# Patient Record
Sex: MALE | Race: WHITE | NOT HISPANIC OR LATINO | Employment: FULL TIME | URBAN - METROPOLITAN AREA
[De-identification: names, ages, dates, MRNs, and addresses within clinical notes are randomized per-mention and may not be internally consistent; named-entity substitution may affect disease eponyms.]

---

## 2023-04-26 LAB
ALANINE AMINOTRANSFERASE (SGPT) (U/L) IN SER/PLAS: 25 U/L (ref 10–52)
ALBUMIN (G/DL) IN SER/PLAS: 4.6 G/DL (ref 3.4–5)
ALKALINE PHOSPHATASE (U/L) IN SER/PLAS: 63 U/L (ref 33–120)
ANION GAP IN SER/PLAS: 12 MMOL/L (ref 10–20)
ASPARTATE AMINOTRANSFERASE (SGOT) (U/L) IN SER/PLAS: 27 U/L (ref 9–39)
BASOPHILS (10*3/UL) IN BLOOD BY AUTOMATED COUNT: 0.04 X10E9/L (ref 0–0.1)
BASOPHILS/100 LEUKOCYTES IN BLOOD BY AUTOMATED COUNT: 0.4 % (ref 0–2)
BILIRUBIN TOTAL (MG/DL) IN SER/PLAS: 0.4 MG/DL (ref 0–1.2)
CALCIUM (MG/DL) IN SER/PLAS: 10.1 MG/DL (ref 8.6–10.6)
CARBON DIOXIDE, TOTAL (MMOL/L) IN SER/PLAS: 32 MMOL/L (ref 21–32)
CHLORIDE (MMOL/L) IN SER/PLAS: 100 MMOL/L (ref 98–107)
CHOLESTEROL (MG/DL) IN SER/PLAS: 217 MG/DL (ref 0–199)
CHOLESTEROL IN HDL (MG/DL) IN SER/PLAS: 48.7 MG/DL
CHOLESTEROL/HDL RATIO: 4.5
CREATININE (MG/DL) IN SER/PLAS: 1.14 MG/DL (ref 0.5–1.3)
EOSINOPHILS (10*3/UL) IN BLOOD BY AUTOMATED COUNT: 0.07 X10E9/L (ref 0–0.7)
EOSINOPHILS/100 LEUKOCYTES IN BLOOD BY AUTOMATED COUNT: 0.6 % (ref 0–6)
ERYTHROCYTE DISTRIBUTION WIDTH (RATIO) BY AUTOMATED COUNT: 13.1 % (ref 11.5–14.5)
ERYTHROCYTE MEAN CORPUSCULAR HEMOGLOBIN CONCENTRATION (G/DL) BY AUTOMATED: 32.3 G/DL (ref 32–36)
ERYTHROCYTE MEAN CORPUSCULAR VOLUME (FL) BY AUTOMATED COUNT: 100 FL (ref 80–100)
ERYTHROCYTES (10*6/UL) IN BLOOD BY AUTOMATED COUNT: 4.65 X10E12/L (ref 4.5–5.9)
GFR MALE: 80 ML/MIN/1.73M2
GLUCOSE (MG/DL) IN SER/PLAS: 100 MG/DL (ref 74–99)
HEMATOCRIT (%) IN BLOOD BY AUTOMATED COUNT: 46.5 % (ref 41–52)
HEMOGLOBIN (G/DL) IN BLOOD: 15 G/DL (ref 13.5–17.5)
IMMATURE GRANULOCYTES/100 LEUKOCYTES IN BLOOD BY AUTOMATED COUNT: 0.4 % (ref 0–0.9)
LDL: 123 MG/DL (ref 0–99)
LEUKOCYTES (10*3/UL) IN BLOOD BY AUTOMATED COUNT: 11.4 X10E9/L (ref 4.4–11.3)
LYMPHOCYTES (10*3/UL) IN BLOOD BY AUTOMATED COUNT: 3.44 X10E9/L (ref 1.2–4.8)
LYMPHOCYTES/100 LEUKOCYTES IN BLOOD BY AUTOMATED COUNT: 30.3 % (ref 13–44)
MONOCYTES (10*3/UL) IN BLOOD BY AUTOMATED COUNT: 0.69 X10E9/L (ref 0.1–1)
MONOCYTES/100 LEUKOCYTES IN BLOOD BY AUTOMATED COUNT: 6.1 % (ref 2–10)
NEUTROPHILS (10*3/UL) IN BLOOD BY AUTOMATED COUNT: 7.09 X10E9/L (ref 1.2–7.7)
NEUTROPHILS/100 LEUKOCYTES IN BLOOD BY AUTOMATED COUNT: 62.2 % (ref 40–80)
NON HDL CHOLESTEROL: 168 MG/DL
NRBC (PER 100 WBCS) BY AUTOMATED COUNT: 0 /100 WBC (ref 0–0)
PLATELETS (10*3/UL) IN BLOOD AUTOMATED COUNT: 310 X10E9/L (ref 150–450)
POTASSIUM (MMOL/L) IN SER/PLAS: 4.8 MMOL/L (ref 3.5–5.3)
PROSTATE SPECIFIC AG (NG/ML) IN SER/PLAS: 0.28 NG/ML (ref 0–4)
PROTEIN TOTAL: 7.1 G/DL (ref 6.4–8.2)
SODIUM (MMOL/L) IN SER/PLAS: 139 MMOL/L (ref 136–145)
THYROTROPIN (MIU/L) IN SER/PLAS BY DETECTION LIMIT <= 0.05 MIU/L: 0.34 MIU/L (ref 0.44–3.98)
THYROXINE (T4) FREE (NG/DL) IN SER/PLAS: 0.86 NG/DL (ref 0.78–1.48)
TRIGLYCERIDE (MG/DL) IN SER/PLAS: 225 MG/DL (ref 0–149)
UREA NITROGEN (MG/DL) IN SER/PLAS: 13 MG/DL (ref 6–23)
VLDL: 45 MG/DL (ref 0–40)

## 2023-05-03 LAB
TESTOSTERONE FREE (CHAN): 23.6 PG/ML (ref 35–155)
TESTOSTERONE,TOTAL,LC-MS/MS: 107 NG/DL (ref 250–1100)

## 2023-06-06 LAB
FOLLITROPIN (IU/L) IN SER/PLAS: 4 IU/L
LUTEINIZING HORMONE (IU/ML) IN SER/PLAS: 1.9 IU/L

## 2023-06-09 LAB — ADRENOCORTICOTROPIC HORMONE: 14.5 PG/ML (ref 7.2–63.3)

## 2023-06-13 LAB — CORTISOL FREE, SERUM: 0.28 UG/DL

## 2023-07-17 ENCOUNTER — TELEMEDICINE (OUTPATIENT)
Dept: PRIMARY CARE | Facility: CLINIC | Age: 48
End: 2023-07-17
Payer: MEDICARE

## 2023-07-17 DIAGNOSIS — G25.81 RESTLESS LEG SYNDROME: Primary | ICD-10-CM

## 2023-07-17 PROCEDURE — 99213 OFFICE O/P EST LOW 20 MIN: CPT | Performed by: FAMILY MEDICINE

## 2023-07-17 ASSESSMENT — ENCOUNTER SYMPTOMS
SHORTNESS OF BREATH: 0
FEVER: 0

## 2023-07-18 NOTE — PROGRESS NOTES
"Subjective   Patient ID: Gael Lara is a 47 y.o. male who presents for No chief complaint on file..  HPI  Patient presents for virtual visit.  He is accompanied by his wife.  He is concerned with worsening restless sensation in both arms and legs, and generalized itching over last few nights, no response to Hydroxyzine, not much of response to Benadryl.      Patient has been treated with Mirapex and Requip in past, without much relief.  He states he has been also treated with Gabapentin in past.  Review of Systems   Constitutional:  Negative for fever.   Respiratory:  Negative for shortness of breath.    Cardiovascular:  Negative for chest pain.   Neurological:         Sensation of \"needing to move\" in both arms and legs       Objective   There were no vitals filed for this visit.   Physical Exam  Constitutional:       General: He is not in acute distress.     Appearance: Normal appearance.   HENT:      Head: Normocephalic and atraumatic.      Right Ear: External ear normal.      Left Ear: External ear normal.      Nose: Nose normal.   Eyes:      Conjunctiva/sclera: Conjunctivae normal.   Pulmonary:      Effort: Pulmonary effort is normal. No respiratory distress.   Neurological:      Mental Status: He is alert and oriented to person, place, and time.   Psychiatric:         Mood and Affect: Mood normal.         Behavior: Behavior normal.         Thought Content: Thought content normal.         Assessment/Plan   There are no diagnoses linked to this encounter.    Problem List Items Addressed This Visit    None  Visit Diagnoses       Restless leg syndrome    -  Primary    This is really not something that can be adequately addressed via virtual visit.  Discussed specific iron studies:TIBC, ferritin levels, also sleep referral          "

## 2023-10-08 PROBLEM — R60.9 PERIPHERAL EDEMA: Status: ACTIVE | Noted: 2023-10-08

## 2023-10-08 PROBLEM — F42.2 MIXED OBSESSIONAL THOUGHTS AND ACTS: Status: ACTIVE | Noted: 2022-01-07

## 2023-10-08 PROBLEM — M25.475 BILATERAL SWELLING OF FEET AND ANKLES: Status: ACTIVE | Noted: 2023-10-08

## 2023-10-08 PROBLEM — M25.561 BILATERAL KNEE PAIN: Status: ACTIVE | Noted: 2018-09-26

## 2023-10-08 PROBLEM — R52 PAIN: Status: ACTIVE | Noted: 2023-10-08

## 2023-10-08 PROBLEM — S93.491A SPRAIN OF ANTERIOR TALOFIBULAR LIGAMENT OF RIGHT ANKLE: Status: ACTIVE | Noted: 2023-10-08

## 2023-10-08 PROBLEM — J45.991 COUGH VARIANT ASTHMA (HHS-HCC): Status: ACTIVE | Noted: 2020-03-16

## 2023-10-08 PROBLEM — F33.1 DEPRESSION, MAJOR, RECURRENT, MODERATE (MULTI): Status: ACTIVE | Noted: 2023-10-08

## 2023-10-08 PROBLEM — R05.3 CHRONIC COUGH: Status: ACTIVE | Noted: 2020-03-16

## 2023-10-08 PROBLEM — R68.82 DECREASED LIBIDO: Status: ACTIVE | Noted: 2023-10-08

## 2023-10-08 PROBLEM — N62 GYNECOMASTIA: Status: ACTIVE | Noted: 2023-10-08

## 2023-10-08 PROBLEM — M79.18 CERVICAL MYOFASCIAL PAIN SYNDROME: Status: ACTIVE | Noted: 2020-03-16

## 2023-10-08 PROBLEM — B37.0 CANDIDIASIS OF MOUTH: Status: ACTIVE | Noted: 2020-03-16

## 2023-10-08 PROBLEM — G60.9 IDIOPATHIC PERIPHERAL NEUROPATHY: Status: ACTIVE | Noted: 2020-03-16

## 2023-10-08 PROBLEM — R79.89 ABNORMAL THYROID BLOOD TEST: Status: ACTIVE | Noted: 2023-10-08

## 2023-10-08 PROBLEM — T50.905A ADVERSE EFFECT OF DRUG: Status: ACTIVE | Noted: 2023-10-08

## 2023-10-08 PROBLEM — R26.2 DIFFICULTY WALKING: Status: ACTIVE | Noted: 2023-10-08

## 2023-10-08 PROBLEM — M25.472 BILATERAL SWELLING OF FEET AND ANKLES: Status: ACTIVE | Noted: 2023-10-08

## 2023-10-08 PROBLEM — G99.2 STENOSIS OF CERVICAL SPINE WITH MYELOPATHY (MULTI): Status: ACTIVE | Noted: 2023-10-08

## 2023-10-08 PROBLEM — H91.90 HEARING LOSS: Status: ACTIVE | Noted: 2020-03-16

## 2023-10-08 PROBLEM — E88.9 METABOLIC DISORDER: Status: ACTIVE | Noted: 2023-10-08

## 2023-10-08 PROBLEM — R03.0 ELEVATED BLOOD PRESSURE READING: Status: ACTIVE | Noted: 2023-10-08

## 2023-10-08 PROBLEM — N64.4 BREAST TENDERNESS IN MALE: Status: ACTIVE | Noted: 2023-10-08

## 2023-10-08 PROBLEM — R06.83 SNORING: Status: ACTIVE | Noted: 2020-03-16

## 2023-10-08 PROBLEM — H69.93 EUSTACHIAN TUBE DYSFUNCTION, BILATERAL: Status: ACTIVE | Noted: 2018-09-26

## 2023-10-08 PROBLEM — M54.12 CERVICAL RADICULOPATHY: Status: ACTIVE | Noted: 2020-03-16

## 2023-10-08 PROBLEM — M77.50 TENDONITIS OF ANKLE OR FOOT: Status: ACTIVE | Noted: 2023-10-08

## 2023-10-08 PROBLEM — R60.0 EDEMA OF BOTH LOWER EXTREMITIES: Status: ACTIVE | Noted: 2023-10-08

## 2023-10-08 PROBLEM — R06.2 WHEEZING ON AUSCULTATION: Status: ACTIVE | Noted: 2023-10-08

## 2023-10-08 PROBLEM — F11.21 OPIOID DEPENDENCE IN REMISSION (MULTI): Status: ACTIVE | Noted: 2022-01-07

## 2023-10-08 PROBLEM — M25.474 BILATERAL SWELLING OF FEET AND ANKLES: Status: ACTIVE | Noted: 2023-10-08

## 2023-10-08 PROBLEM — I87.2 CHRONIC VENOUS INSUFFICIENCY: Status: ACTIVE | Noted: 2023-10-08

## 2023-10-08 PROBLEM — G47.00 INSOMNIA: Status: ACTIVE | Noted: 2020-03-16

## 2023-10-08 PROBLEM — M25.50 JOINT PAIN: Status: ACTIVE | Noted: 2023-10-08

## 2023-10-08 PROBLEM — M54.2 NECK PAIN: Status: ACTIVE | Noted: 2023-10-08

## 2023-10-08 PROBLEM — G47.19 EXCESSIVE DAYTIME SLEEPINESS: Status: ACTIVE | Noted: 2023-10-08

## 2023-10-08 PROBLEM — S16.1XXA STRAIN OF NECK MUSCLE: Status: ACTIVE | Noted: 2020-03-16

## 2023-10-08 PROBLEM — K12.0 APHTHAE, ORAL: Status: ACTIVE | Noted: 2023-10-08

## 2023-10-08 PROBLEM — E86.1 HYPOVOLEMIA: Status: ACTIVE | Noted: 2020-03-16

## 2023-10-08 PROBLEM — B37.81: Status: ACTIVE | Noted: 2023-10-08

## 2023-10-08 PROBLEM — K13.79 MOUTH SORES: Status: ACTIVE | Noted: 2023-10-08

## 2023-10-08 PROBLEM — M25.471 RIGHT ANKLE SWELLING: Status: ACTIVE | Noted: 2023-10-08

## 2023-10-08 PROBLEM — F41.0 PANIC DISORDER WITHOUT AGORAPHOBIA: Status: ACTIVE | Noted: 2022-08-22

## 2023-10-08 PROBLEM — M48.02 STENOSIS OF CERVICAL SPINE WITH MYELOPATHY (MULTI): Status: ACTIVE | Noted: 2023-10-08

## 2023-10-08 PROBLEM — G25.0 ESSENTIAL TREMOR: Status: ACTIVE | Noted: 2020-03-16

## 2023-10-08 PROBLEM — R53.83 MALAISE AND FATIGUE: Status: ACTIVE | Noted: 2020-03-16

## 2023-10-08 PROBLEM — D84.9 IMMUNOSUPPRESSION (MULTI): Status: ACTIVE | Noted: 2020-03-16

## 2023-10-08 PROBLEM — R51.9 CHRONIC HEADACHE DISORDER: Status: ACTIVE | Noted: 2020-03-16

## 2023-10-08 PROBLEM — J18.9 COMMUNITY ACQUIRED PNEUMONIA: Status: ACTIVE | Noted: 2020-03-16

## 2023-10-08 PROBLEM — M50.30 DEGENERATIVE DISC DISEASE, CERVICAL: Status: ACTIVE | Noted: 2023-10-08

## 2023-10-08 PROBLEM — B27.90 INFECTIOUS MONONUCLEOSIS: Status: ACTIVE | Noted: 2020-03-16

## 2023-10-08 PROBLEM — R29.818 SUSPECTED SLEEP APNEA: Status: ACTIVE | Noted: 2023-10-08

## 2023-10-08 PROBLEM — M25.562 BILATERAL KNEE PAIN: Status: ACTIVE | Noted: 2018-09-26

## 2023-10-08 PROBLEM — F41.8 MIXED ANXIETY DEPRESSIVE DISORDER: Status: ACTIVE | Noted: 2023-10-08

## 2023-10-08 PROBLEM — R79.89 DECREASED THYROID STIMULATING HORMONE (TSH) LEVEL: Status: ACTIVE | Noted: 2020-03-16

## 2023-10-08 PROBLEM — R09.89 CHEST CONGESTION: Status: ACTIVE | Noted: 2023-10-08

## 2023-10-08 PROBLEM — K11.20 SIALOADENITIS: Status: ACTIVE | Noted: 2020-03-16

## 2023-10-08 PROBLEM — G89.29 CHRONIC HEADACHE DISORDER: Status: ACTIVE | Noted: 2020-03-16

## 2023-10-08 PROBLEM — R51.9 HEADACHE: Status: ACTIVE | Noted: 2018-09-26

## 2023-10-08 PROBLEM — G89.29 CHRONIC PAIN OF BOTH EARS: Status: ACTIVE | Noted: 2018-09-26

## 2023-10-08 PROBLEM — A09 INFECTIOUS COLITIS: Status: ACTIVE | Noted: 2020-03-16

## 2023-10-08 PROBLEM — K42.9 UMBILICAL HERNIA: Status: ACTIVE | Noted: 2020-03-16

## 2023-10-08 PROBLEM — R53.81 MALAISE AND FATIGUE: Status: ACTIVE | Noted: 2020-03-16

## 2023-10-08 PROBLEM — M17.0 ARTHRITIS OF BOTH KNEES: Status: ACTIVE | Noted: 2020-03-16

## 2023-10-08 PROBLEM — I10 BENIGN ESSENTIAL HYPERTENSION: Status: ACTIVE | Noted: 2020-03-16

## 2023-10-08 PROBLEM — G61.0: Status: ACTIVE | Noted: 2018-01-30

## 2023-10-08 PROBLEM — M25.571 PAIN IN LATERAL PORTION OF RIGHT ANKLE: Status: ACTIVE | Noted: 2023-10-08

## 2023-10-08 PROBLEM — M25.471 BILATERAL SWELLING OF FEET AND ANKLES: Status: ACTIVE | Noted: 2023-10-08

## 2023-10-08 PROBLEM — I47.19 ATRIAL TACHYCARDIA (CMS-HCC): Status: ACTIVE | Noted: 2020-03-16

## 2023-10-08 PROBLEM — N28.9 ABNORMAL KIDNEY FUNCTION: Status: ACTIVE | Noted: 2023-10-08

## 2023-10-08 PROBLEM — G47.33 OBSTRUCTIVE SLEEP APNEA SYNDROME: Status: ACTIVE | Noted: 2020-03-16

## 2023-10-08 PROBLEM — E05.90 SUBCLINICAL HYPERTHYROIDISM: Status: ACTIVE | Noted: 2020-03-16

## 2023-10-08 PROBLEM — F31.81 BIPOLAR II DISORDER (MULTI): Status: ACTIVE | Noted: 2022-01-07

## 2023-10-08 PROBLEM — J30.0 VASOMOTOR RHINITIS: Status: ACTIVE | Noted: 2023-10-08

## 2023-10-08 PROBLEM — H92.03 CHRONIC PAIN OF BOTH EARS: Status: ACTIVE | Noted: 2018-09-26

## 2023-10-08 PROBLEM — E29.1 HYPOGONADISM MALE: Status: ACTIVE | Noted: 2023-10-08

## 2023-10-08 PROBLEM — H93.8X9 PRESSURE SENSATION IN EAR: Status: ACTIVE | Noted: 2018-09-26

## 2023-10-08 PROBLEM — R60.0 PERIPHERAL EDEMA: Status: ACTIVE | Noted: 2023-10-08

## 2023-10-08 PROBLEM — T50.905A DRUG REACTION: Status: ACTIVE | Noted: 2023-10-08

## 2023-10-08 RX ORDER — ESKETAMINE HYDROCHLORIDE 28 MG/.2ML
SOLUTION NASAL
COMMUNITY
Start: 2023-05-05 | End: 2023-12-06 | Stop reason: ALTCHOICE

## 2023-10-08 RX ORDER — FINASTERIDE 5 MG/1
1 TABLET, FILM COATED ORAL DAILY
COMMUNITY
Start: 2016-04-11 | End: 2023-12-06 | Stop reason: SDUPTHER

## 2023-10-08 RX ORDER — DULOXETIN HYDROCHLORIDE 60 MG/1
60 CAPSULE, DELAYED RELEASE ORAL DAILY
COMMUNITY
End: 2023-12-06 | Stop reason: ALTCHOICE

## 2023-10-08 RX ORDER — TIZANIDINE 4 MG/1
1 TABLET ORAL 3 TIMES DAILY PRN
COMMUNITY
Start: 2021-08-10 | End: 2023-12-06 | Stop reason: ALTCHOICE

## 2023-10-08 RX ORDER — BACLOFEN 5 MG/1
5 TABLET ORAL 4 TIMES DAILY
COMMUNITY
Start: 2023-08-02 | End: 2023-12-06 | Stop reason: SDUPTHER

## 2023-10-08 RX ORDER — NAPROXEN 500 MG/1
500 TABLET ORAL
COMMUNITY
Start: 2018-11-02

## 2023-10-08 RX ORDER — ROPINIROLE 0.5 MG/1
0.5 TABLET, FILM COATED ORAL 3 TIMES DAILY
COMMUNITY
Start: 2023-06-16 | End: 2023-12-06 | Stop reason: ALTCHOICE

## 2023-10-08 RX ORDER — DIVALPROEX SODIUM 500 MG/1
2 TABLET, FILM COATED, EXTENDED RELEASE ORAL NIGHTLY
COMMUNITY
Start: 2023-01-05 | End: 2023-12-06 | Stop reason: ALTCHOICE

## 2023-10-08 RX ORDER — VILAZODONE HYDROCHLORIDE 10 MG/1
10 TABLET ORAL DAILY
COMMUNITY
Start: 2023-07-11 | End: 2024-03-21 | Stop reason: ALTCHOICE

## 2023-10-08 RX ORDER — ESCITALOPRAM OXALATE 20 MG/1
2 TABLET ORAL DAILY
COMMUNITY
Start: 2023-04-03 | End: 2023-12-06 | Stop reason: ALTCHOICE

## 2023-10-08 RX ORDER — CLOMIPRAMINE HYDROCHLORIDE 50 MG/1
200 CAPSULE ORAL NIGHTLY
COMMUNITY
Start: 2022-11-19 | End: 2023-12-06 | Stop reason: ALTCHOICE

## 2023-10-08 RX ORDER — BENZTROPINE MESYLATE 2 MG/1
2 TABLET ORAL 3 TIMES DAILY PRN
COMMUNITY
Start: 2022-11-01 | End: 2023-12-06 | Stop reason: ALTCHOICE

## 2023-10-08 RX ORDER — DULOXETIN HYDROCHLORIDE 30 MG/1
30 CAPSULE, DELAYED RELEASE ORAL
COMMUNITY
Start: 2023-01-05 | End: 2023-12-06 | Stop reason: ALTCHOICE

## 2023-10-08 RX ORDER — VALACYCLOVIR HYDROCHLORIDE 1 G/1
1000 TABLET, FILM COATED ORAL EVERY 12 HOURS
COMMUNITY
Start: 2023-06-13 | End: 2023-10-18 | Stop reason: SDUPTHER

## 2023-10-08 RX ORDER — ESKETAMINE HYDROCHLORIDE 28 MG/.2ML
SOLUTION NASAL
COMMUNITY
Start: 2023-06-26 | End: 2023-12-06 | Stop reason: ALTCHOICE

## 2023-10-08 RX ORDER — GABAPENTIN 100 MG/1
1-2 CAPSULE ORAL 3 TIMES DAILY
COMMUNITY
Start: 2022-05-04 | End: 2023-12-06 | Stop reason: ALTCHOICE

## 2023-10-08 RX ORDER — OXCARBAZEPINE 300 MG/1
TABLET, FILM COATED ORAL
COMMUNITY
Start: 2022-01-07 | End: 2023-12-06 | Stop reason: ALTCHOICE

## 2023-10-08 RX ORDER — TESTOSTERONE CYPIONATE 200 MG/ML
2 INJECTION, SOLUTION INTRAMUSCULAR
COMMUNITY
End: 2023-12-06 | Stop reason: ALTCHOICE

## 2023-10-08 RX ORDER — HYDROXYZINE PAMOATE 50 MG/1
CAPSULE ORAL
COMMUNITY
Start: 2020-01-22 | End: 2023-12-06 | Stop reason: ALTCHOICE

## 2023-10-08 RX ORDER — BACLOFEN 5 MG/1
1 TABLET ORAL 3 TIMES DAILY
COMMUNITY
Start: 2023-03-28 | End: 2024-03-21 | Stop reason: SDUPTHER

## 2023-10-08 RX ORDER — BUPRENORPHINE HYDROCHLORIDE 8 MG/1
8 TABLET SUBLINGUAL DAILY
COMMUNITY
Start: 2022-05-31 | End: 2023-12-06 | Stop reason: ALTCHOICE

## 2023-10-08 RX ORDER — PROPRANOLOL HYDROCHLORIDE 40 MG/1
1 TABLET ORAL 2 TIMES DAILY
COMMUNITY
Start: 2021-01-27 | End: 2023-12-06 | Stop reason: ALTCHOICE

## 2023-10-08 RX ORDER — IMIPRAMINE HYDROCHLORIDE 50 MG/1
50 TABLET, FILM COATED ORAL NIGHTLY
COMMUNITY
End: 2023-12-06 | Stop reason: ALTCHOICE

## 2023-10-08 RX ORDER — PRAMIPEXOLE DIHYDROCHLORIDE 0.5 MG/1
0.5 TABLET ORAL 2 TIMES DAILY
COMMUNITY
Start: 2023-07-10 | End: 2023-12-06 | Stop reason: ALTCHOICE

## 2023-10-08 RX ORDER — CLOMIPHENE CITRATE 50 MG/1
50 TABLET ORAL DAILY
COMMUNITY
End: 2023-12-06 | Stop reason: ALTCHOICE

## 2023-10-08 RX ORDER — RISPERIDONE 1 MG/1
1 TABLET ORAL EVERY EVENING
COMMUNITY
End: 2023-12-06 | Stop reason: ALTCHOICE

## 2023-10-08 RX ORDER — VORTIOXETINE 10 MG/1
10 TABLET, FILM COATED ORAL DAILY
COMMUNITY
Start: 2023-04-18 | End: 2023-12-06 | Stop reason: ALTCHOICE

## 2023-10-08 RX ORDER — IBUPROFEN 600 MG/1
TABLET ORAL
COMMUNITY
Start: 2018-04-27

## 2023-10-08 RX ORDER — DIVALPROEX SODIUM 250 MG/1
250 TABLET, FILM COATED, EXTENDED RELEASE ORAL DAILY
COMMUNITY
Start: 2023-01-23 | End: 2023-12-06 | Stop reason: ALTCHOICE

## 2023-10-08 RX ORDER — CLOMIPRAMINE HYDROCHLORIDE 25 MG/1
CAPSULE ORAL NIGHTLY
COMMUNITY
Start: 2022-10-19 | End: 2023-12-06 | Stop reason: ALTCHOICE

## 2023-10-08 RX ORDER — BUPROPION HYDROCHLORIDE 150 MG/1
TABLET, EXTENDED RELEASE ORAL
COMMUNITY
Start: 2021-05-18 | End: 2023-12-06 | Stop reason: ALTCHOICE

## 2023-10-08 RX ORDER — TRAMADOL HYDROCHLORIDE AND ACETAMINOPHEN 37.5; 325 MG/1; MG/1
1 TABLET, FILM COATED ORAL AS NEEDED
COMMUNITY
Start: 2018-09-24 | End: 2023-12-06 | Stop reason: ALTCHOICE

## 2023-10-08 RX ORDER — ALPRAZOLAM 0.25 MG/1
0.25 TABLET ORAL NIGHTLY
COMMUNITY
Start: 2023-06-29 | End: 2024-05-02 | Stop reason: ALTCHOICE

## 2023-10-08 RX ORDER — POLYETHYLENE GLYCOL 3350 17 G/17G
17 POWDER, FOR SOLUTION ORAL DAILY PRN
COMMUNITY

## 2023-10-08 RX ORDER — CITALOPRAM 20 MG/1
1 TABLET, FILM COATED ORAL DAILY
COMMUNITY
Start: 2021-01-12 | End: 2023-12-06 | Stop reason: ALTCHOICE

## 2023-10-08 RX ORDER — SENNOSIDES 8.6 MG/1
2 TABLET ORAL DAILY
COMMUNITY
End: 2023-12-06 | Stop reason: ALTCHOICE

## 2023-10-08 RX ORDER — FLUTICASONE PROPIONATE 50 MCG
1 SPRAY, SUSPENSION (ML) NASAL
COMMUNITY

## 2023-10-08 RX ORDER — LAMOTRIGINE 200 MG/1
1 TABLET ORAL 2 TIMES DAILY
COMMUNITY
End: 2024-05-02 | Stop reason: ALTCHOICE

## 2023-10-08 RX ORDER — METOPROLOL SUCCINATE 25 MG/1
TABLET, EXTENDED RELEASE ORAL EVERY 24 HOURS
COMMUNITY
Start: 2019-09-18 | End: 2023-12-06 | Stop reason: ALTCHOICE

## 2023-10-08 RX ORDER — ONDANSETRON 4 MG/1
4 TABLET, FILM COATED ORAL DAILY PRN
COMMUNITY
Start: 2023-05-15 | End: 2023-12-06 | Stop reason: ALTCHOICE

## 2023-10-08 RX ORDER — LAMOTRIGINE 100 MG/1
1 TABLET ORAL DAILY
COMMUNITY
Start: 2023-03-13 | End: 2023-12-06 | Stop reason: ALTCHOICE

## 2023-10-08 RX ORDER — CYCLOBENZAPRINE HCL 10 MG
TABLET ORAL
COMMUNITY
Start: 2020-01-21 | End: 2023-12-06 | Stop reason: ALTCHOICE

## 2023-10-08 RX ORDER — ESCITALOPRAM OXALATE 20 MG/1
20 TABLET ORAL
COMMUNITY
Start: 2023-01-05 | End: 2023-12-06 | Stop reason: ALTCHOICE

## 2023-10-08 RX ORDER — HYDROCHLOROTHIAZIDE 12.5 MG/1
12.5 TABLET ORAL EVERY MORNING
COMMUNITY
End: 2023-10-18 | Stop reason: SDUPTHER

## 2023-10-08 RX ORDER — ONDANSETRON HYDROCHLORIDE 8 MG/1
8 TABLET, FILM COATED ORAL AS NEEDED
COMMUNITY
Start: 2023-06-28 | End: 2023-12-06 | Stop reason: ALTCHOICE

## 2023-10-08 RX ORDER — QUETIAPINE FUMARATE 25 MG/1
1-2 TABLET, FILM COATED ORAL EVERY MORNING
COMMUNITY
Start: 2022-09-20 | End: 2023-12-06 | Stop reason: ALTCHOICE

## 2023-10-08 RX ORDER — VALACYCLOVIR HYDROCHLORIDE 1 G/1
1 TABLET, FILM COATED ORAL DAILY
COMMUNITY
Start: 2016-04-19 | End: 2023-10-18 | Stop reason: ALTCHOICE

## 2023-10-08 RX ORDER — LEVOTHYROXINE SODIUM 50 UG/1
1 TABLET ORAL DAILY
COMMUNITY
Start: 2021-11-11 | End: 2023-12-06 | Stop reason: ALTCHOICE

## 2023-10-08 RX ORDER — TESTOSTERONE CYPIONATE 200 MG/ML
400 INJECTION, SOLUTION INTRAMUSCULAR
COMMUNITY
End: 2023-12-06 | Stop reason: ALTCHOICE

## 2023-10-08 RX ORDER — TRAZODONE HYDROCHLORIDE 100 MG/1
100 TABLET ORAL NIGHTLY PRN
COMMUNITY

## 2023-10-08 RX ORDER — NYSTATIN 100000 [USP'U]/ML
5 SUSPENSION ORAL 3 TIMES DAILY
COMMUNITY
End: 2023-12-12 | Stop reason: SDUPTHER

## 2023-10-08 RX ORDER — GABAPENTIN 300 MG/1
1 CAPSULE ORAL 2 TIMES DAILY PRN
COMMUNITY
Start: 2022-04-26 | End: 2023-12-06 | Stop reason: ALTCHOICE

## 2023-10-08 RX ORDER — CLONIDINE HYDROCHLORIDE 0.1 MG/1
1 TABLET ORAL DAILY
COMMUNITY
Start: 2021-01-12 | End: 2023-12-06 | Stop reason: ALTCHOICE

## 2023-10-08 RX ORDER — FINASTERIDE 5 MG/1
2.5 TABLET, FILM COATED ORAL 3 TIMES WEEKLY
COMMUNITY
Start: 2020-03-16

## 2023-10-08 RX ORDER — MIRTAZAPINE 15 MG/1
15 TABLET, FILM COATED ORAL NIGHTLY PRN
COMMUNITY
Start: 2021-03-23 | End: 2023-12-06 | Stop reason: ALTCHOICE

## 2023-10-08 RX ORDER — QUETIAPINE FUMARATE 100 MG/1
100-200 TABLET, FILM COATED ORAL DAILY PRN
COMMUNITY
End: 2023-12-06 | Stop reason: ALTCHOICE

## 2023-10-08 RX ORDER — HYDROXYZINE HYDROCHLORIDE 25 MG/1
25 TABLET, FILM COATED ORAL NIGHTLY
COMMUNITY
Start: 2023-06-29 | End: 2023-12-06 | Stop reason: ALTCHOICE

## 2023-10-08 RX ORDER — VORTIOXETINE 20 MG/1
20 TABLET, FILM COATED ORAL DAILY
COMMUNITY
End: 2023-12-06 | Stop reason: ALTCHOICE

## 2023-10-08 RX ORDER — QUETIAPINE FUMARATE 200 MG/1
400 TABLET, FILM COATED ORAL 2 TIMES DAILY PRN
COMMUNITY

## 2023-10-08 RX ORDER — SILDENAFIL 50 MG/1
50 TABLET, FILM COATED ORAL DAILY
COMMUNITY
Start: 2023-02-01

## 2023-10-08 RX ORDER — DOXYCYCLINE 100 MG/1
100 CAPSULE ORAL EVERY 12 HOURS
COMMUNITY
Start: 2019-12-30 | End: 2024-03-21 | Stop reason: SDUPTHER

## 2023-10-08 RX ORDER — DOXEPIN HYDROCHLORIDE 50 MG/1
50 CAPSULE ORAL NIGHTLY
COMMUNITY
Start: 2023-01-05 | End: 2023-12-06 | Stop reason: ALTCHOICE

## 2023-10-08 RX ORDER — FLUTICASONE PROPIONATE 220 UG/1
2 AEROSOL, METERED RESPIRATORY (INHALATION) 2 TIMES DAILY
COMMUNITY
Start: 2018-04-13 | End: 2023-10-09 | Stop reason: SDUPTHER

## 2023-10-08 RX ORDER — DOXEPIN HYDROCHLORIDE 50 MG/1
CAPSULE ORAL NIGHTLY
COMMUNITY
Start: 2022-12-28 | End: 2023-12-06 | Stop reason: ALTCHOICE

## 2023-10-08 RX ORDER — DULOXETIN HYDROCHLORIDE 30 MG/1
CAPSULE, DELAYED RELEASE ORAL
COMMUNITY
Start: 2022-03-29 | End: 2023-12-06 | Stop reason: ALTCHOICE

## 2023-10-08 RX ORDER — LEVOTHYROXINE SODIUM 75 UG/1
75 TABLET ORAL DAILY
COMMUNITY
Start: 2023-06-15 | End: 2023-12-06 | Stop reason: ALTCHOICE

## 2023-10-08 RX ORDER — METFORMIN HYDROCHLORIDE 500 MG/1
500 TABLET ORAL
COMMUNITY
End: 2024-01-02 | Stop reason: SDUPTHER

## 2023-10-08 RX ORDER — TRAZODONE HYDROCHLORIDE 50 MG/1
50 TABLET ORAL NIGHTLY PRN
COMMUNITY
End: 2023-12-06 | Stop reason: ALTCHOICE

## 2023-10-08 RX ORDER — HYDROXYZINE PAMOATE 25 MG/1
25 CAPSULE ORAL 3 TIMES DAILY PRN
COMMUNITY
Start: 2021-03-23 | End: 2023-12-06 | Stop reason: ALTCHOICE

## 2023-10-08 RX ORDER — PROPRANOLOL HYDROCHLORIDE 10 MG/1
10 TABLET ORAL EVERY 8 HOURS PRN
COMMUNITY
Start: 2023-03-24 | End: 2023-12-06 | Stop reason: ALTCHOICE

## 2023-10-08 RX ORDER — OXCARBAZEPINE 600 MG/1
TABLET, FILM COATED ORAL
COMMUNITY
Start: 2022-03-29 | End: 2023-12-06 | Stop reason: ALTCHOICE

## 2023-10-08 RX ORDER — LAMOTRIGINE 25 MG/1
TABLET ORAL
COMMUNITY
Start: 2023-02-17 | End: 2023-12-06 | Stop reason: ALTCHOICE

## 2023-10-08 RX ORDER — PRAMIPEXOLE DIHYDROCHLORIDE 1 MG/1
TABLET ORAL
COMMUNITY
Start: 2023-07-17 | End: 2023-12-06 | Stop reason: ALTCHOICE

## 2023-10-08 RX ORDER — ARIPIPRAZOLE 10 MG/1
TABLET ORAL
COMMUNITY
Start: 2022-03-29 | End: 2024-03-21 | Stop reason: ALTCHOICE

## 2023-10-08 RX ORDER — PRAMIPEXOLE DIHYDROCHLORIDE 0.25 MG/1
0.25 TABLET ORAL 2 TIMES DAILY
COMMUNITY
End: 2023-12-06 | Stop reason: ALTCHOICE

## 2023-10-09 DIAGNOSIS — R06.2 WHEEZING ON AUSCULTATION: ICD-10-CM

## 2023-10-09 RX ORDER — FLUTICASONE PROPIONATE 220 UG/1
2 AEROSOL, METERED RESPIRATORY (INHALATION) 2 TIMES DAILY
Qty: 12 G | Refills: 3 | Status: SHIPPED | OUTPATIENT
Start: 2023-10-09

## 2023-10-10 ENCOUNTER — APPOINTMENT (OUTPATIENT)
Dept: PRIMARY CARE | Facility: CLINIC | Age: 48
End: 2023-10-10
Payer: MEDICARE

## 2023-10-18 DIAGNOSIS — I10 BENIGN ESSENTIAL HYPERTENSION: ICD-10-CM

## 2023-10-18 DIAGNOSIS — K13.79 MOUTH SORES: ICD-10-CM

## 2023-10-18 RX ORDER — VALACYCLOVIR HYDROCHLORIDE 1 G/1
1000 TABLET, FILM COATED ORAL EVERY 12 HOURS
Qty: 60 TABLET | Refills: 2 | Status: SHIPPED | OUTPATIENT
Start: 2023-10-18 | End: 2023-12-06 | Stop reason: SDUPTHER

## 2023-10-18 RX ORDER — HYDROCHLOROTHIAZIDE 12.5 MG/1
12.5 TABLET ORAL EVERY MORNING
Qty: 90 TABLET | Refills: 0 | Status: SHIPPED | OUTPATIENT
Start: 2023-10-18 | End: 2024-01-24 | Stop reason: SDUPTHER

## 2023-11-30 ENCOUNTER — TELEPHONE (OUTPATIENT)
Dept: PRIMARY CARE | Facility: CLINIC | Age: 48
End: 2023-11-30
Payer: MEDICARE

## 2023-11-30 DIAGNOSIS — R05.1 ACUTE COUGH: ICD-10-CM

## 2023-11-30 RX ORDER — AZITHROMYCIN 500 MG/1
500 TABLET, FILM COATED ORAL DAILY
Qty: 6 TABLET | Refills: 0 | Status: SHIPPED | OUTPATIENT
Start: 2023-11-30 | End: 2023-12-06

## 2023-11-30 RX ORDER — BENZONATATE 200 MG/1
200 CAPSULE ORAL 3 TIMES DAILY PRN
Qty: 42 CAPSULE | Refills: 0 | Status: SHIPPED | OUTPATIENT
Start: 2023-11-30 | End: 2023-12-30

## 2023-11-30 NOTE — TELEPHONE ENCOUNTER
Per gs-he will send in azithromycin and tessalon pearls and pt to follow up in a week. Pt informed, pt scheduled

## 2023-12-06 ENCOUNTER — OFFICE VISIT (OUTPATIENT)
Dept: PRIMARY CARE | Facility: CLINIC | Age: 48
End: 2023-12-06
Payer: MEDICARE

## 2023-12-06 VITALS
HEART RATE: 72 BPM | RESPIRATION RATE: 16 BRPM | HEIGHT: 69 IN | BODY MASS INDEX: 32.58 KG/M2 | DIASTOLIC BLOOD PRESSURE: 85 MMHG | WEIGHT: 220 LBS | SYSTOLIC BLOOD PRESSURE: 140 MMHG

## 2023-12-06 DIAGNOSIS — D84.9 IMMUNOSUPPRESSION (MULTI): ICD-10-CM

## 2023-12-06 DIAGNOSIS — R53.83 MALAISE AND FATIGUE: ICD-10-CM

## 2023-12-06 DIAGNOSIS — I87.2 CHRONIC VENOUS INSUFFICIENCY: ICD-10-CM

## 2023-12-06 DIAGNOSIS — J98.01 COUGH DUE TO BRONCHOSPASM: Primary | ICD-10-CM

## 2023-12-06 DIAGNOSIS — K13.79 MOUTH SORES: ICD-10-CM

## 2023-12-06 DIAGNOSIS — B27.99 INFECTIOUS MONONUCLEOSIS, WITH OTHER COMPLICATION, INFECTIOUS MONONUCLEOSIS DUE TO UNSPECIFIED ORGANISM: ICD-10-CM

## 2023-12-06 DIAGNOSIS — R53.81 MALAISE AND FATIGUE: ICD-10-CM

## 2023-12-06 DIAGNOSIS — E88.9 METABOLIC DISORDER: ICD-10-CM

## 2023-12-06 DIAGNOSIS — G47.33 OBSTRUCTIVE SLEEP APNEA SYNDROME: ICD-10-CM

## 2023-12-06 DIAGNOSIS — N28.9 ABNORMAL KIDNEY FUNCTION: ICD-10-CM

## 2023-12-06 DIAGNOSIS — I10 BENIGN ESSENTIAL HYPERTENSION: ICD-10-CM

## 2023-12-06 PROCEDURE — 3077F SYST BP >= 140 MM HG: CPT | Performed by: INTERNAL MEDICINE

## 2023-12-06 PROCEDURE — 3079F DIAST BP 80-89 MM HG: CPT | Performed by: INTERNAL MEDICINE

## 2023-12-06 PROCEDURE — 1036F TOBACCO NON-USER: CPT | Performed by: INTERNAL MEDICINE

## 2023-12-06 PROCEDURE — 99214 OFFICE O/P EST MOD 30 MIN: CPT | Performed by: INTERNAL MEDICINE

## 2023-12-06 RX ORDER — BUSPIRONE HYDROCHLORIDE 15 MG/1
15 TABLET ORAL 3 TIMES DAILY
COMMUNITY
Start: 2023-09-06 | End: 2024-03-21 | Stop reason: ALTCHOICE

## 2023-12-06 RX ORDER — VALACYCLOVIR HYDROCHLORIDE 1 G/1
1000 TABLET, FILM COATED ORAL EVERY 12 HOURS
Qty: 60 TABLET | Refills: 2 | Status: SHIPPED | OUTPATIENT
Start: 2023-12-06 | End: 2024-01-24 | Stop reason: SDUPTHER

## 2023-12-06 RX ORDER — AZITHROMYCIN 500 MG/1
500 TABLET, FILM COATED ORAL DAILY
Qty: 6 TABLET | Refills: 0 | Status: SHIPPED | OUTPATIENT
Start: 2023-12-06 | End: 2023-12-14 | Stop reason: ALTCHOICE

## 2023-12-06 RX ORDER — CLONAZEPAM 1 MG/1
TABLET ORAL
COMMUNITY
Start: 2023-12-04

## 2023-12-06 ASSESSMENT — ENCOUNTER SYMPTOMS
CARDIOVASCULAR NEGATIVE: 1
ENDOCRINE NEGATIVE: 1
CONSTITUTIONAL NEGATIVE: 1
EYES NEGATIVE: 1
PSYCHIATRIC NEGATIVE: 1
MUSCULOSKELETAL NEGATIVE: 1
ALLERGIC/IMMUNOLOGIC NEGATIVE: 1
GASTROINTESTINAL NEGATIVE: 1
RESPIRATORY NEGATIVE: 1
NEUROLOGICAL NEGATIVE: 1
HEMATOLOGIC/LYMPHATIC NEGATIVE: 1

## 2023-12-06 NOTE — PROGRESS NOTES
"Subjective   Patient ID: Gael Lara is a 47 y.o. male who presents for Follow-up (Follow up on cold).    HPI     Review of Systems   Constitutional: Negative.    HENT: Negative.     Eyes: Negative.    Respiratory: Negative.     Cardiovascular: Negative.    Gastrointestinal: Negative.    Endocrine: Negative.    Musculoskeletal: Negative.    Skin: Negative.    Allergic/Immunologic: Negative.    Neurological: Negative.    Hematological: Negative.    Psychiatric/Behavioral: Negative.     All other systems reviewed and are negative.      Objective   Ht 1.753 m (5' 9\")   Wt 99.8 kg (220 lb)   BMI 32.49 kg/m²   Blood pressure 140/85, pulse 72, resp. rate 16, height 1.753 m (5' 9\"), weight 99.8 kg (220 lb).   Physical Exam  Vitals and nursing note reviewed.   Constitutional:       Appearance: Normal appearance.   HENT:      Head: Normocephalic and atraumatic.      Right Ear: Tympanic membrane, ear canal and external ear normal.      Left Ear: Tympanic membrane, ear canal and external ear normal. There is no impacted cerumen.      Nose: Nose normal.      Mouth/Throat:      Mouth: Mucous membranes are moist.      Pharynx: Oropharynx is clear.   Eyes:      Extraocular Movements: Extraocular movements intact.      Conjunctiva/sclera: Conjunctivae normal.      Pupils: Pupils are equal, round, and reactive to light.   Cardiovascular:      Rate and Rhythm: Normal rate and regular rhythm.      Pulses: Normal pulses.      Heart sounds: Normal heart sounds. No murmur heard.  Pulmonary:      Effort: Pulmonary effort is normal. No respiratory distress.      Breath sounds: Normal breath sounds. No stridor. No wheezing, rhonchi or rales.   Chest:      Chest wall: No tenderness.   Abdominal:      General: Abdomen is flat. Bowel sounds are normal. There is no distension.      Palpations: Abdomen is soft. There is no mass.      Tenderness: There is no abdominal tenderness. There is no right CVA tenderness, left CVA tenderness, guarding " or rebound.      Hernia: No hernia is present.   Musculoskeletal:         General: Normal range of motion.      Cervical back: Normal range of motion and neck supple.   Skin:     General: Skin is warm.      Capillary Refill: Capillary refill takes less than 2 seconds.   Neurological:      General: No focal deficit present.      Mental Status: He is alert.      Cranial Nerves: No cranial nerve deficit.      Sensory: No sensory deficit.      Motor: No weakness.      Coordination: Coordination normal.      Gait: Gait normal.      Deep Tendon Reflexes: Reflexes normal.   Psychiatric:         Mood and Affect: Mood normal.         Behavior: Behavior normal. Behavior is cooperative.         Thought Content: Thought content normal.         Judgment: Judgment normal.         Assessment/Plan   Problem List Items Addressed This Visit             ICD-10-CM    Abnormal kidney function N28.9     CRI - Chronic Renal Insuficiency. Secondary to DM/HTN/Atherosclerosis. Follow BUN/Cr. and lytes.   RENOPROTECTION with ACEI/ARB (). Monitor microalbuminuria. Avoid hypotension and renal hypoperfusion. The patient was instructed to avoid NSAIDS and educate compliance with medications to control HTN(hypertension) and cholesterol and diabetes.                                     Benign essential hypertension I10     HTN - hypertension well/controlled .Target BP < 130/80  achieved. Educate low salt diet and exercise with weight loss. Educate home self monitoring and diary keeping. Educate risks of elevate blood pressure and benefits of prompt treatment.  Refill hydrochlorothiazide          Mouth sores K13.79    Chronic venous insufficiency I87.2     Edema - and leg swelling dur to venous insufficiency - responding to low dose Furosemide and recommend: leg elevation and compression stockings -          Immunosuppression (CMS/HCC) D84.9     Needs vaccinations and Prophylactic treatment          Infectious mononucleosis B27.90     Chronic  fatigue syndrome and consider Prednisone taper PRN and antiviral          Malaise and fatigue R53.81, R53.83     Fatigue - check CMP(metabolic panel and elctrolytes) , CBC(complete blood cell count), TSH(thyroid function). Insomnia may play a role and sleep studies(rule out sleep apnea) are recommended . Educate sleep hygiene. Consider anxiety disorder vs. depression. Consider Stress test, and 2DECHO.           Metabolic disorder - Primary E88.9     Educate extensively low carbohydrate diet AND LOW FAT DIET AND increase in exercise activity  and weight loss program and monitor HbA1C and glucose levels and consider Metformin 500 mg BID with meals            Obstructive sleep apnea syndrome G47.33     Sleep apnea/nocturnal hypoxia - Not/ Witnessed - The patient is complaining of day-time sleepiness(falling asleep easily/  narcolepsy) while driving or sitting still. Also patient complains of major tiredness/ fatigue, multiple episodes of night time awakenings(unexpalined). Also patient is at high risk for sleep apnea: overweight, small throat opening and enlarged (kissing) tonsils, sedentary lifestyle, sleeping aides. Recommend: Sleep studies: Nocturnal oxymetry, sleep lab. Consider CPAP vs. Oxygen per Nasal Canula at night at 2L/min. for nocturnal hypoxia           Bronchitis with wheezing                                           Recommend:                                                                                                      mild  Pharyngitis,                                                                                                                                                                               Start: Azithromycin 500 mg daily for 6 days  -                                                                                                                                                             Asthma/COPD exacerbation       Allegra 180mg qD vs. Claritin 10 mg qD and Mucinex                                                                                                                                               Cough  - start Azithromycin 500 mg daily for 6 days and Allegra 180 mg daily and Flonase I puff BID and avoids cold exposure  -  Avoid cold exposure and take vitamins C 500 and B complex  qD   Hycodan syrup one tea spoon qHS 4 oz.      Sinusitis - allergic vs. infectious - start Allegra and Flonase I BID and Azithromycin 500 mg qD for 6 days. and avoid cold exposure.

## 2023-12-06 NOTE — ASSESSMENT & PLAN NOTE
Bronchitis with wheezing                                           Recommend:                                                                                                      mild  Pharyngitis,                                                                                                                                                                               Start: Azithromycin 500 mg daily for 6 days  -                                                                                                                                                             Asthma/COPD exacerbation       Allegra 180mg qD vs. Claritin 10 mg qD and Mucinex                                                                                                                                             Cough  - start Azithromycin 500 mg daily for 6 days and Allegra 180 mg daily and Flonase I puff BID and avoids cold exposure  -  Avoid cold exposure and take vitamins C 500 and B complex  qD   Hycodan syrup one tea spoon qHS 4 oz.     Sinusitis - allergic vs. infectious - start Allegra and Flonase I BID and Azithromycin 500 mg qD for 6 days. and avoid cold exposure.

## 2023-12-06 NOTE — ASSESSMENT & PLAN NOTE
Edema - and leg swelling dur to venous insufficiency - responding to low dose Furosemide and recommend: leg elevation and compression stockings -

## 2023-12-06 NOTE — ASSESSMENT & PLAN NOTE
Sleep apnea/nocturnal hypoxia - Not/ Witnessed - The patient is complaining of day-time sleepiness(falling asleep easily/  narcolepsy) while driving or sitting still. Also patient complains of major tiredness/ fatigue, multiple episodes of night time awakenings(unexpalined). Also patient is at high risk for sleep apnea: overweight, small throat opening and enlarged (kissing) tonsils, sedentary lifestyle, sleeping aides. Recommend: Sleep studies: Nocturnal oxymetry, sleep lab. Consider CPAP vs. Oxygen per Nasal Canula at night at 2L/min. for nocturnal hypoxia

## 2023-12-06 NOTE — ASSESSMENT & PLAN NOTE
HTN - hypertension well/controlled .Target BP < 130/80  achieved. Educate low salt diet and exercise with weight loss. Educate home self monitoring and diary keeping. Educate risks of elevate blood pressure and benefits of prompt treatment.  Refill hydrochlorothiazide

## 2023-12-06 NOTE — ASSESSMENT & PLAN NOTE
Educate extensively low carbohydrate diet AND LOW FAT DIET AND increase in exercise activity  and weight loss program and monitor HbA1C and glucose levels and consider Metformin 500 mg BID with meals

## 2023-12-12 ENCOUNTER — TELEPHONE (OUTPATIENT)
Dept: PRIMARY CARE | Facility: CLINIC | Age: 48
End: 2023-12-12
Payer: MEDICARE

## 2023-12-12 DIAGNOSIS — B37.0 CANDIDIASIS OF MOUTH: ICD-10-CM

## 2023-12-12 RX ORDER — NYSTATIN 100000 [USP'U]/ML
5 SUSPENSION ORAL 3 TIMES DAILY
Qty: 473 ML | Refills: 3 | Status: SHIPPED | OUTPATIENT
Start: 2023-12-12

## 2023-12-14 DIAGNOSIS — M62.838 MUSCLE SPASM: ICD-10-CM

## 2023-12-14 RX ORDER — CYCLOBENZAPRINE HCL 10 MG
10 TABLET ORAL NIGHTLY PRN
Qty: 30 TABLET | Refills: 0 | Status: SHIPPED | OUTPATIENT
Start: 2023-12-14 | End: 2024-01-15 | Stop reason: SDUPTHER

## 2023-12-14 RX ORDER — METHYLPHENIDATE HYDROCHLORIDE 5 MG/1
15 TABLET ORAL 3 TIMES DAILY
COMMUNITY
Start: 2023-12-11

## 2023-12-14 RX ORDER — CYCLOBENZAPRINE HCL 10 MG
10 TABLET ORAL NIGHTLY PRN
COMMUNITY
End: 2023-12-14 | Stop reason: SDUPTHER

## 2023-12-29 ENCOUNTER — TELEMEDICINE (OUTPATIENT)
Dept: PRIMARY CARE | Facility: CLINIC | Age: 48
End: 2023-12-29
Payer: MEDICARE

## 2023-12-29 DIAGNOSIS — U07.1 COVID: Primary | ICD-10-CM

## 2023-12-29 DIAGNOSIS — U07.1 COVID: ICD-10-CM

## 2023-12-29 PROCEDURE — 99212 OFFICE O/P EST SF 10 MIN: CPT | Performed by: NURSE PRACTITIONER

## 2023-12-29 RX ORDER — NIRMATRELVIR AND RITONAVIR 300-100 MG
3 KIT ORAL 2 TIMES DAILY
Qty: 30 TABLET | Refills: 0 | Status: SHIPPED | OUTPATIENT
Start: 2023-12-29 | End: 2024-01-03

## 2023-12-29 NOTE — PROGRESS NOTES
Gael Lara is a 48 y.o. male who presents virtually today for prescription for Covid     Pt location in [place] and consent obtained.   Telemedicine appropriate evaluation completed.  Unable to perform complete physical exam due to virtual visit, patient was visualized on interactive video.      Chief Complaint   Patient presents with    covid     Pt reports he tested POSITIVE for Covid today  Pt reports his symptoms (head congestion) began today  Pt is requesting Paxlovid Rx   Pt is not having SOB/RUIZ, CP or fevers     Review of Systems  All 13 systems were reviewed and are within normal limits except positive and pertinent negative responses which are noted below or in HPI.        Objective   Vitals:  There were no vitals taken for this visit.        Physical Exam  Pulmonary:      Effort: No respiratory distress.   Neurological:      Mental Status: He is alert.   Psychiatric:         Mood and Affect: Mood normal.         Thought Content: Thought content normal.         Assessment/Plan   Problem List Items Addressed This Visit    None  Visit Diagnoses         Codes    COVID    -  Primary U07.1    Relevant Medications    nirmatrelvir-ritonavir (Paxlovid) 300 mg (150 mg x 2)-100 mg tablet therapy pack

## 2023-12-29 NOTE — PATIENT INSTRUCTIONS
Thank you for seeing me today.  It was a pleasure to meet you!    I sent Paxlovid to your pharmacy     If you have COVID-19, you can spread the virus to others. There are precautions you can take to prevent spreading it to others: isolation, masking, and avoiding contact with people who are at high risk of getting very sick. Isolation is used to separate people with confirmed or suspected COVID-19 from those without COVID-19.    **If you tested POSITIVE or HAVE SYMPTOMS of Covid-19, you should stay home for 6 days from the day symptoms began or 6 days from the day you tested positive and wear a high-quality mask around other people for 10 days from symptom onset or date of positive test.   If your symptoms are improving, you have been fever-free for 24 hours, without the use of fever-reducing medication, and you have access to antigen tests, you should consider using them after your isolation. With 2 sequential negative tests 48 hours apart, you may remove your mask sooner than 10 days.  If your antigen test results are positive, you may still be infectious and should not remove your mask around others. Continue taking antigen tests at least 48 hours apart until you have 2 sequential negative results. This may mean you need to continue wearing a mask and testing beyond day 10.      Please visit https://www.cdc.gov/coronavirus/2019-ncov/your-health/isolation.html to utilize the isolation and exposure calculator if you have any questions or concerns about Covid-19.

## 2024-01-02 ENCOUNTER — TELEPHONE (OUTPATIENT)
Dept: PRIMARY CARE | Facility: CLINIC | Age: 49
End: 2024-01-02
Payer: MEDICARE

## 2024-01-02 DIAGNOSIS — E08.9 DIABETES MELLITUS DUE TO UNDERLYING CONDITION WITHOUT COMPLICATION, WITHOUT LONG-TERM CURRENT USE OF INSULIN (MULTI): Primary | ICD-10-CM

## 2024-01-02 RX ORDER — METFORMIN HYDROCHLORIDE 500 MG/1
500 TABLET ORAL
Qty: 90 TABLET | Refills: 0 | Status: SHIPPED | OUTPATIENT
Start: 2024-01-02 | End: 2024-03-21 | Stop reason: SDUPTHER

## 2024-01-15 DIAGNOSIS — M62.838 MUSCLE SPASM: ICD-10-CM

## 2024-01-15 RX ORDER — CYCLOBENZAPRINE HCL 10 MG
10 TABLET ORAL NIGHTLY PRN
Qty: 30 TABLET | Refills: 0 | Status: SHIPPED | OUTPATIENT
Start: 2024-01-15 | End: 2024-02-12 | Stop reason: SDUPTHER

## 2024-01-24 DIAGNOSIS — I10 BENIGN ESSENTIAL HYPERTENSION: ICD-10-CM

## 2024-01-24 DIAGNOSIS — K13.79 MOUTH SORES: ICD-10-CM

## 2024-01-25 RX ORDER — VALACYCLOVIR HYDROCHLORIDE 1 G/1
1000 TABLET, FILM COATED ORAL EVERY 12 HOURS
Qty: 60 TABLET | Refills: 2 | Status: SHIPPED | OUTPATIENT
Start: 2024-01-25 | End: 2024-03-21 | Stop reason: SDUPTHER

## 2024-01-25 RX ORDER — HYDROCHLOROTHIAZIDE 12.5 MG/1
12.5 TABLET ORAL EVERY MORNING
Qty: 90 TABLET | Refills: 0 | Status: SHIPPED | OUTPATIENT
Start: 2024-01-25 | End: 2024-03-21 | Stop reason: SDUPTHER

## 2024-02-12 DIAGNOSIS — M62.838 MUSCLE SPASM: ICD-10-CM

## 2024-02-12 RX ORDER — CYCLOBENZAPRINE HCL 10 MG
10 TABLET ORAL NIGHTLY PRN
Qty: 30 TABLET | Refills: 0 | Status: SHIPPED | OUTPATIENT
Start: 2024-02-12

## 2024-03-21 ENCOUNTER — APPOINTMENT (OUTPATIENT)
Dept: PRIMARY CARE | Facility: CLINIC | Age: 49
End: 2024-03-21
Payer: MEDICARE

## 2024-03-21 ENCOUNTER — OFFICE VISIT (OUTPATIENT)
Dept: PRIMARY CARE | Facility: CLINIC | Age: 49
End: 2024-03-21
Payer: MEDICARE

## 2024-03-21 VITALS
BODY MASS INDEX: 31.84 KG/M2 | HEART RATE: 72 BPM | WEIGHT: 215 LBS | DIASTOLIC BLOOD PRESSURE: 70 MMHG | RESPIRATION RATE: 16 BRPM | SYSTOLIC BLOOD PRESSURE: 120 MMHG | HEIGHT: 69 IN

## 2024-03-21 DIAGNOSIS — N28.9 ABNORMAL KIDNEY FUNCTION: ICD-10-CM

## 2024-03-21 DIAGNOSIS — K13.79 MOUTH SORES: ICD-10-CM

## 2024-03-21 DIAGNOSIS — B27.99 INFECTIOUS MONONUCLEOSIS, WITH OTHER COMPLICATION, INFECTIOUS MONONUCLEOSIS DUE TO UNSPECIFIED ORGANISM: ICD-10-CM

## 2024-03-21 DIAGNOSIS — F33.1 DEPRESSION, MAJOR, RECURRENT, MODERATE (MULTI): ICD-10-CM

## 2024-03-21 DIAGNOSIS — D84.9 IMMUNOSUPPRESSION (MULTI): ICD-10-CM

## 2024-03-21 DIAGNOSIS — R60.9 PERIPHERAL EDEMA: ICD-10-CM

## 2024-03-21 DIAGNOSIS — M25.562 CHRONIC PAIN OF BOTH KNEES: ICD-10-CM

## 2024-03-21 DIAGNOSIS — M25.561 CHRONIC PAIN OF BOTH KNEES: ICD-10-CM

## 2024-03-21 DIAGNOSIS — F51.01 PRIMARY INSOMNIA: ICD-10-CM

## 2024-03-21 DIAGNOSIS — R79.89 DECREASED THYROID STIMULATING HORMONE (TSH) LEVEL: ICD-10-CM

## 2024-03-21 DIAGNOSIS — E29.1 HYPOGONADISM MALE: Primary | ICD-10-CM

## 2024-03-21 DIAGNOSIS — E08.9 DIABETES MELLITUS DUE TO UNDERLYING CONDITION WITHOUT COMPLICATION, WITHOUT LONG-TERM CURRENT USE OF INSULIN (MULTI): ICD-10-CM

## 2024-03-21 DIAGNOSIS — R53.83 MALAISE AND FATIGUE: ICD-10-CM

## 2024-03-21 DIAGNOSIS — R53.81 MALAISE AND FATIGUE: ICD-10-CM

## 2024-03-21 DIAGNOSIS — M48.02 STENOSIS OF CERVICAL SPINE WITH MYELOPATHY (MULTI): ICD-10-CM

## 2024-03-21 DIAGNOSIS — M25.471 RIGHT ANKLE SWELLING: ICD-10-CM

## 2024-03-21 DIAGNOSIS — G99.2 STENOSIS OF CERVICAL SPINE WITH MYELOPATHY (MULTI): ICD-10-CM

## 2024-03-21 DIAGNOSIS — I87.2 CHRONIC VENOUS INSUFFICIENCY: ICD-10-CM

## 2024-03-21 DIAGNOSIS — R52 PAIN: ICD-10-CM

## 2024-03-21 DIAGNOSIS — G89.29 CHRONIC PAIN OF BOTH KNEES: ICD-10-CM

## 2024-03-21 DIAGNOSIS — E88.9 METABOLIC DISORDER: ICD-10-CM

## 2024-03-21 DIAGNOSIS — I10 BENIGN ESSENTIAL HYPERTENSION: ICD-10-CM

## 2024-03-21 DIAGNOSIS — G47.33 OBSTRUCTIVE SLEEP APNEA SYNDROME: ICD-10-CM

## 2024-03-21 DIAGNOSIS — M54.12 CERVICAL RADICULOPATHY AT C5: ICD-10-CM

## 2024-03-21 PROCEDURE — 3078F DIAST BP <80 MM HG: CPT | Performed by: INTERNAL MEDICINE

## 2024-03-21 PROCEDURE — 99214 OFFICE O/P EST MOD 30 MIN: CPT | Performed by: INTERNAL MEDICINE

## 2024-03-21 PROCEDURE — 3074F SYST BP LT 130 MM HG: CPT | Performed by: INTERNAL MEDICINE

## 2024-03-21 PROCEDURE — 1036F TOBACCO NON-USER: CPT | Performed by: INTERNAL MEDICINE

## 2024-03-21 RX ORDER — VENLAFAXINE HYDROCHLORIDE 150 MG/1
150 CAPSULE, EXTENDED RELEASE ORAL DAILY
COMMUNITY
Start: 2024-03-09

## 2024-03-21 RX ORDER — DOXYCYCLINE 100 MG/1
100 CAPSULE ORAL EVERY 12 HOURS
Qty: 180 CAPSULE | Refills: 0 | Status: SHIPPED | OUTPATIENT
Start: 2024-03-21 | End: 2024-05-02 | Stop reason: SDUPTHER

## 2024-03-21 RX ORDER — VALACYCLOVIR HYDROCHLORIDE 1 G/1
1000 TABLET, FILM COATED ORAL EVERY 12 HOURS
Qty: 60 TABLET | Refills: 2 | Status: SHIPPED | OUTPATIENT
Start: 2024-03-21 | End: 2024-05-02 | Stop reason: SDUPTHER

## 2024-03-21 RX ORDER — DEXTROMETHORPHAN HYDROBROMIDE, BUPROPION HYDROCHLORIDE 105; 45 MG/1; MG/1
2 TABLET, MULTILAYER, EXTENDED RELEASE ORAL DAILY
COMMUNITY
Start: 2024-03-10

## 2024-03-21 RX ORDER — BACLOFEN 5 MG/1
5 TABLET ORAL 3 TIMES DAILY
Qty: 270 TABLET | Refills: 0 | Status: SHIPPED | OUTPATIENT
Start: 2024-03-21

## 2024-03-21 RX ORDER — METFORMIN HYDROCHLORIDE 500 MG/1
500 TABLET ORAL
Qty: 90 TABLET | Refills: 0 | Status: SHIPPED | OUTPATIENT
Start: 2024-03-21 | End: 2024-05-02 | Stop reason: SDUPTHER

## 2024-03-21 RX ORDER — HYDROCHLOROTHIAZIDE 12.5 MG/1
12.5 TABLET ORAL EVERY MORNING
Qty: 90 TABLET | Refills: 0 | Status: SHIPPED | OUTPATIENT
Start: 2024-03-21 | End: 2024-05-02 | Stop reason: SDUPTHER

## 2024-03-21 ASSESSMENT — ENCOUNTER SYMPTOMS
MUSCULOSKELETAL NEGATIVE: 1
CARDIOVASCULAR NEGATIVE: 1
ALLERGIC/IMMUNOLOGIC NEGATIVE: 1
EYES NEGATIVE: 1
NEUROLOGICAL NEGATIVE: 1
ENDOCRINE NEGATIVE: 1
GASTROINTESTINAL NEGATIVE: 1
CONSTITUTIONAL NEGATIVE: 1
PSYCHIATRIC NEGATIVE: 1
HEMATOLOGIC/LYMPHATIC NEGATIVE: 1
RESPIRATORY NEGATIVE: 1

## 2024-03-21 NOTE — PROGRESS NOTES
"Subjective   Patient ID: Gael Lara is a 48 y.o. male who presents for Follow-up (Follow up for refills ).    HPI     Review of Systems   Constitutional: Negative.    HENT: Negative.     Eyes: Negative.    Respiratory: Negative.     Cardiovascular: Negative.    Gastrointestinal: Negative.    Endocrine: Negative.    Musculoskeletal: Negative.    Skin: Negative.    Allergic/Immunologic: Negative.    Neurological: Negative.    Hematological: Negative.    Psychiatric/Behavioral: Negative.     All other systems reviewed and are negative.      Objective   Ht 1.753 m (5' 9\")   Wt 97.5 kg (215 lb)   BMI 31.75 kg/m²   Blood pressure 120/70, pulse 72, resp. rate 16, height 1.753 m (5' 9\"), weight 97.5 kg (215 lb).   Physical Exam  Vitals and nursing note reviewed.   Constitutional:       Appearance: Normal appearance.   HENT:      Head: Normocephalic and atraumatic.      Right Ear: Tympanic membrane, ear canal and external ear normal.      Left Ear: Tympanic membrane, ear canal and external ear normal. There is no impacted cerumen.      Nose: Nose normal.      Mouth/Throat:      Mouth: Mucous membranes are moist.      Pharynx: Oropharynx is clear.   Eyes:      Extraocular Movements: Extraocular movements intact.      Conjunctiva/sclera: Conjunctivae normal.      Pupils: Pupils are equal, round, and reactive to light.   Cardiovascular:      Rate and Rhythm: Normal rate and regular rhythm.      Pulses: Normal pulses.      Heart sounds: Normal heart sounds. No murmur heard.  Pulmonary:      Effort: Pulmonary effort is normal. No respiratory distress.      Breath sounds: Normal breath sounds. No stridor. No wheezing, rhonchi or rales.   Chest:      Chest wall: No tenderness.   Abdominal:      General: Abdomen is flat. Bowel sounds are normal. There is no distension.      Palpations: Abdomen is soft. There is no mass.      Tenderness: There is no abdominal tenderness. There is no right CVA tenderness, left CVA tenderness, " guarding or rebound.      Hernia: No hernia is present.   Musculoskeletal:         General: Normal range of motion.      Cervical back: Normal range of motion and neck supple.   Skin:     General: Skin is warm.      Capillary Refill: Capillary refill takes less than 2 seconds.   Neurological:      General: No focal deficit present.      Mental Status: He is alert.      Cranial Nerves: No cranial nerve deficit.      Sensory: No sensory deficit.      Motor: No weakness.      Coordination: Coordination normal.      Gait: Gait normal.      Deep Tendon Reflexes: Reflexes normal.   Psychiatric:         Mood and Affect: Mood normal.         Behavior: Behavior normal. Behavior is cooperative.         Thought Content: Thought content normal.         Judgment: Judgment normal.       Assessment/Plan   Problem List Items Addressed This Visit             ICD-10-CM    Abnormal kidney function N28.9    Benign essential hypertension I10    Relevant Medications    hydroCHLOROthiazide (Microzide) 12.5 mg tablet    Bilateral knee pain M25.561, M25.562    Mouth sores K13.79    Relevant Medications    valACYclovir (Valtrex) 1 gram tablet    doxycycline (Monodox) 100 mg capsule    Cervical radiculopathy at C5 M54.12    Stenosis of cervical spine with myelopathy (CMS/HCC) M48.02, G99.2    Chronic venous insufficiency I87.2    Decreased thyroid stimulating hormone (TSH) level R79.89    Depression, major, recurrent, moderate (CMS/HCC) F33.1    Hypogonadism male E29.1    Immunosuppression (CMS/HCC) D84.9    Infectious mononucleosis B27.90    Insomnia G47.00    Malaise and fatigue R53.81, R53.83    Metabolic disorder E88.9    Obstructive sleep apnea syndrome - Primary G47.33    Pain R52    Relevant Medications    baclofen (Lioresal) 5 mg tablet    Right ankle swelling M25.471    Peripheral edema R60.9     Other Visit Diagnoses         Codes    Diabetes mellitus due to underlying condition without complication, without long-term current use of  insulin (CMS/[facility])     E08.9    Relevant Medications    metFORMIN (Glucophage) 500 mg tablet              Abnormal kidney function N28.9        CRI - Chronic Renal Insuficiency. Secondary to DM/HTN/Atherosclerosis. Follow BUN/Cr. and lytes.   RENOPROTECTION with ACEI/ARB (). Monitor microalbuminuria. Avoid hypotension and renal hypoperfusion. The patient was instructed to avoid NSAIDS and educate compliance with medications to control HTN(hypertension) and cholesterol and diabetes.                                       Benign essential hypertension I10       HTN - hypertension well/controlled .Target BP < 130/80  achieved. Educate low salt diet and exercise with weight loss. Educate home self monitoring and diary keeping. Educate risks of elevate blood pressure and benefits of prompt treatment.  Refill hydrochlorothiazide            Mouth sores K13.79     Chronic venous insufficiency I87.2       Edema - and leg swelling dur to venous insufficiency - responding to low dose Furosemide and recommend: leg elevation and compression stockings -            Immunosuppression (CMS/[facility]) D84.9       Needs vaccinations and Prophylactic treatment            Infectious mononucleosis B27.90       Chronic fatigue syndrome and consider Prednisone taper PRN and antiviral            Malaise and fatigue R53.81, R53.83       Fatigue - check CMP(metabolic panel and elctrolytes) , CBC(complete blood cell count), TSH(thyroid function). Insomnia may play a role and sleep studies(rule out sleep apnea) are recommended . Educate sleep hygiene. Consider anxiety disorder vs. depression. Consider Stress test, and 2DECHO.             Metabolic disorder - Primary E88.9       Educate extensively low carbohydrate diet AND LOW FAT DIET AND increase in exercise activity  and weight loss program and monitor HbA1C and glucose levels and consider Metformin 500 mg BID with meals              Obstructive sleep apnea syndrome G47.33       Sleep  apnea/nocturnal hypoxia - Not/ Witnessed - The patient is complaining of day-time sleepiness(falling asleep easily/  narcolepsy) while driving or sitting still. Also patient complains of major tiredness/ fatigue, multiple episodes of night time awakenings(unexpalined). Also patient is at high risk for sleep apnea: overweight, small throat opening and enlarged (kissing) tonsils, sedentary lifestyle, sleeping aides. Recommend: Sleep studies: Nocturnal oxymetry, sleep lab. Consider CPAP vs. Oxygen per Nasal Canula at night at 2L/min. for nocturnal hypoxia

## 2024-03-26 ENCOUNTER — TELEPHONE (OUTPATIENT)
Dept: PRIMARY CARE | Facility: CLINIC | Age: 49
End: 2024-03-26
Payer: MEDICARE

## 2024-03-26 NOTE — TELEPHONE ENCOUNTER
Per gs-he does not meet the criteria for the medication, he is not type 2 diabetic. Lm pt informed

## 2024-04-15 ENCOUNTER — TELEPHONE (OUTPATIENT)
Dept: PRIMARY CARE | Facility: CLINIC | Age: 49
End: 2024-04-15
Payer: MEDICARE

## 2024-04-16 ENCOUNTER — TELEPHONE (OUTPATIENT)
Dept: PRIMARY CARE | Facility: CLINIC | Age: 49
End: 2024-04-16
Payer: MEDICARE

## 2024-05-02 ENCOUNTER — OFFICE VISIT (OUTPATIENT)
Dept: PRIMARY CARE | Facility: CLINIC | Age: 49
End: 2024-05-02
Payer: MEDICARE

## 2024-05-02 VITALS
HEART RATE: 112 BPM | BODY MASS INDEX: 31.84 KG/M2 | WEIGHT: 215 LBS | HEIGHT: 69 IN | SYSTOLIC BLOOD PRESSURE: 112 MMHG | DIASTOLIC BLOOD PRESSURE: 82 MMHG | OXYGEN SATURATION: 97 % | RESPIRATION RATE: 16 BRPM

## 2024-05-02 DIAGNOSIS — D84.9 IMMUNOSUPPRESSION (MULTI): ICD-10-CM

## 2024-05-02 DIAGNOSIS — E88.9 METABOLIC DISORDER: ICD-10-CM

## 2024-05-02 DIAGNOSIS — R53.83 OTHER FATIGUE: ICD-10-CM

## 2024-05-02 DIAGNOSIS — I10 BENIGN ESSENTIAL HYPERTENSION: ICD-10-CM

## 2024-05-02 DIAGNOSIS — K13.79 MOUTH SORES: ICD-10-CM

## 2024-05-02 DIAGNOSIS — G60.9 IDIOPATHIC PERIPHERAL NEUROPATHY: ICD-10-CM

## 2024-05-02 DIAGNOSIS — E08.9 DIABETES MELLITUS DUE TO UNDERLYING CONDITION WITHOUT COMPLICATION, WITHOUT LONG-TERM CURRENT USE OF INSULIN (MULTI): Primary | ICD-10-CM

## 2024-05-02 DIAGNOSIS — E29.1 HYPOGONADISM MALE: ICD-10-CM

## 2024-05-02 DIAGNOSIS — I87.2 CHRONIC VENOUS INSUFFICIENCY: ICD-10-CM

## 2024-05-02 DIAGNOSIS — G47.33 OBSTRUCTIVE SLEEP APNEA SYNDROME: ICD-10-CM

## 2024-05-02 DIAGNOSIS — F51.01 PRIMARY INSOMNIA: ICD-10-CM

## 2024-05-02 PROCEDURE — 3079F DIAST BP 80-89 MM HG: CPT | Performed by: INTERNAL MEDICINE

## 2024-05-02 PROCEDURE — 99214 OFFICE O/P EST MOD 30 MIN: CPT | Performed by: INTERNAL MEDICINE

## 2024-05-02 PROCEDURE — 3074F SYST BP LT 130 MM HG: CPT | Performed by: INTERNAL MEDICINE

## 2024-05-02 PROCEDURE — 1036F TOBACCO NON-USER: CPT | Performed by: INTERNAL MEDICINE

## 2024-05-02 RX ORDER — DOXYCYCLINE 100 MG/1
100 CAPSULE ORAL EVERY 12 HOURS
Qty: 180 CAPSULE | Refills: 0 | Status: SHIPPED | OUTPATIENT
Start: 2024-05-02

## 2024-05-02 RX ORDER — METFORMIN HYDROCHLORIDE 500 MG/1
500 TABLET ORAL
Qty: 90 TABLET | Refills: 0 | Status: SHIPPED | OUTPATIENT
Start: 2024-05-02

## 2024-05-02 RX ORDER — DEXTROMETHORPHAN HYDROBROMIDE, BUPROPION HYDROCHLORIDE 105; 45 MG/1; MG/1
2 TABLET, MULTILAYER, EXTENDED RELEASE ORAL DAILY
Qty: 90 TABLET | Refills: 0 | Status: CANCELLED | OUTPATIENT
Start: 2024-05-02

## 2024-05-02 RX ORDER — HYDROCHLOROTHIAZIDE 12.5 MG/1
12.5 TABLET ORAL EVERY MORNING
Qty: 90 TABLET | Refills: 0 | Status: SHIPPED | OUTPATIENT
Start: 2024-05-02

## 2024-05-02 RX ORDER — VALACYCLOVIR HYDROCHLORIDE 1 G/1
1000 TABLET, FILM COATED ORAL EVERY 12 HOURS
Qty: 60 TABLET | Refills: 2 | Status: SHIPPED | OUTPATIENT
Start: 2024-05-02

## 2024-05-02 ASSESSMENT — ENCOUNTER SYMPTOMS
CARDIOVASCULAR NEGATIVE: 1
HEMATOLOGIC/LYMPHATIC NEGATIVE: 1
NEUROLOGICAL NEGATIVE: 1
PSYCHIATRIC NEGATIVE: 1
CONSTITUTIONAL NEGATIVE: 1
MUSCULOSKELETAL NEGATIVE: 1
ENDOCRINE NEGATIVE: 1
GASTROINTESTINAL NEGATIVE: 1
ALLERGIC/IMMUNOLOGIC NEGATIVE: 1
EYES NEGATIVE: 1
RESPIRATORY NEGATIVE: 1

## 2024-05-02 NOTE — PROGRESS NOTES
"Subjective   Patient ID: Gael Laar is a 48 y.o. male who presents for Follow-up (Follow up/Blood work).    HPI     Review of Systems   Constitutional: Negative.    HENT: Negative.     Eyes: Negative.    Respiratory: Negative.     Cardiovascular: Negative.    Gastrointestinal: Negative.    Endocrine: Negative.    Musculoskeletal: Negative.    Skin: Negative.    Allergic/Immunologic: Negative.    Neurological: Negative.    Hematological: Negative.    Psychiatric/Behavioral: Negative.     All other systems reviewed and are negative.      Objective   /82 (BP Location: Left arm, Patient Position: Sitting)   Pulse (!) 112   Resp 16   Ht 1.753 m (5' 9\")   Wt 97.5 kg (215 lb)   SpO2 97%   BMI 31.75 kg/m²     Physical Exam  Vitals and nursing note reviewed.   Constitutional:       Appearance: Normal appearance.   HENT:      Head: Normocephalic and atraumatic.      Right Ear: Tympanic membrane, ear canal and external ear normal.      Left Ear: Tympanic membrane, ear canal and external ear normal. There is no impacted cerumen.      Nose: Nose normal.      Mouth/Throat:      Mouth: Mucous membranes are moist.      Pharynx: Oropharynx is clear.   Eyes:      Extraocular Movements: Extraocular movements intact.      Conjunctiva/sclera: Conjunctivae normal.      Pupils: Pupils are equal, round, and reactive to light.   Cardiovascular:      Rate and Rhythm: Normal rate and regular rhythm.      Pulses: Normal pulses.      Heart sounds: Normal heart sounds. No murmur heard.  Pulmonary:      Effort: Pulmonary effort is normal. No respiratory distress.      Breath sounds: Normal breath sounds. No stridor. No wheezing, rhonchi or rales.   Chest:      Chest wall: No tenderness.   Abdominal:      General: Abdomen is flat. Bowel sounds are normal. There is no distension.      Palpations: Abdomen is soft. There is no mass.      Tenderness: There is no abdominal tenderness. There is no right CVA tenderness, left CVA " tenderness, guarding or rebound.      Hernia: No hernia is present.   Musculoskeletal:         General: Normal range of motion.      Cervical back: Normal range of motion and neck supple.   Skin:     General: Skin is warm.      Capillary Refill: Capillary refill takes less than 2 seconds.   Neurological:      General: No focal deficit present.      Mental Status: He is alert.      Cranial Nerves: No cranial nerve deficit.      Sensory: No sensory deficit.      Motor: No weakness.      Coordination: Coordination normal.      Gait: Gait normal.      Deep Tendon Reflexes: Reflexes normal.   Psychiatric:         Mood and Affect: Mood normal.         Behavior: Behavior normal. Behavior is cooperative.         Thought Content: Thought content normal.         Judgment: Judgment normal.         Assessment/Plan   Problem List Items Addressed This Visit             ICD-10-CM    Benign essential hypertension I10     HTN - hypertension well/controlled .Target BP < 130/80  achieved. Educate low salt diet and exercise with weight loss. Educate home self monitoring and diary keeping. Educate risks of elevate blood pressure and benefits of prompt treatment.  Refill hydrochlorothiazide          Relevant Medications    hydroCHLOROthiazide (Microzide) 12.5 mg tablet    Other Relevant Orders    Comprehensive Metabolic Panel    Mouth sores K13.79    Relevant Medications    valACYclovir (Valtrex) 1 gram tablet    doxycycline (Monodox) 100 mg capsule    Chronic venous insufficiency - Primary I87.2     Edema - and leg swelling dur to venous insufficiency - responding to low dose Furosemide and recommend: leg elevation and compression stockings -          Hypogonadism male E29.1     Hypogonadism  - monitor  Testosterone level  - and supplement 300 mg IM  - now  - inject Testosterone 300 mg IM. Repeat on a monthly basis. Monitor Testosterone level and symptoms ( fatigue, decrease libido, muscle weakness). Educate the patient upon the risks  of Stroke, heart attacks and possible venous thromboembolism with possible pulmonary embolism and even death associated with Testosterone treatment .  The patient understood the risks associated with Testosterone treatment and whishes to continue the Testosterone supplement and aggress with a plan to control tightly the Blood pressure and cholesterol and take a Baby aspirin 81 mg daily to prevent these complications. He enjoys the benefits of the Testosterone. Also check Prolactin and LH and FSH levels to rule out pituitary adenoma and prolactinoma.          Idiopathic peripheral neuropathy G60.9     Neuropathy/ - positive numbness, tingling, discomfort (needles pricking, cold feeling) in distal lower extremities(toes, feet, legs), secondary to DM/Radiculopathy/idiopathic. Recommend: Gabapentin(Neurontin) 300 daily(at bed time) upward taper up to  2 gm/day or Lyrica 75 mg daily if failure of treatment. Also recommend: treatment of Diabetes(control levels of blood sugars), lumbar/ cervical  disc disease (Physical Therapy), and check electrolytes and Thyroid function. Also address regenrative measures: B12 intrmusculary (Monthly) and Folic acid supplementation. Recommend  EMG. Start Amitriptyline 25 mg daily / . Start - Tonic water - and Tramadol 50 mg TID.           Immunosuppression (Multi) D84.9     Needs vaccinations and Prophylactic treatment          Insomnia G47.00    Metabolic disorder E88.9     Educate extensively low carbohydrate diet AND LOW FAT DIET AND increase in exercise activity and weight loss program and monitor HbA1C and glucose levels and consider Metformin 500 mg BID with meals          Obstructive sleep apnea syndrome G47.33     Other Visit Diagnoses         Codes    Diabetes mellitus due to underlying condition without complication, without long-term current use of insulin (Multi)     E08.9    Relevant Medications    metFORMIN (Glucophage) 500 mg tablet    Other Relevant Orders    Lipid Panel     Hemoglobin A1C    Other fatigue     R53.83    Relevant Orders    CBC and Auto Differential    TSH with reflex to Free T4 if abnormal            Abnormal kidney function N28.9         CRI - Chronic Renal Insuficiency. Secondary to DM/HTN/Atherosclerosis. Follow BUN/Cr. and lytes.   RENOPROTECTION with ACEI/ARB (). Monitor microalbuminuria. Avoid hypotension and renal hypoperfusion. The patient was instructed to avoid NSAIDS and educate compliance with medications to control HTN(hypertension) and cholesterol and diabetes.                                       Benign essential hypertension I10       HTN - hypertension well/controlled .Target BP < 130/80  achieved. Educate low salt diet and exercise with weight loss. Educate home self monitoring and diary keeping. Educate risks of elevate blood pressure and benefits of prompt treatment.  Refill hydrochlorothiazide            Mouth sores K13.79     Chronic venous insufficiency I87.2       Edema - and leg swelling dur to venous insufficiency - responding to low dose Furosemide and recommend: leg elevation and compression stockings -            Immunosuppression (CMS/HCC) D84.9       Needs vaccinations and Prophylactic treatment            Infectious mononucleosis B27.90       Chronic fatigue syndrome and consider Prednisone taper PRN and antiviral            Malaise and fatigue R53.81, R53.83       Fatigue - check CMP(metabolic panel and elctrolytes) , CBC(complete blood cell count), TSH(thyroid function). Insomnia may play a role and sleep studies(rule out sleep apnea) are recommended . Educate sleep hygiene. Consider anxiety disorder vs. depression. Consider Stress test, and 2DECHO.             Metabolic disorder - Primary E88.9       Educate extensively low carbohydrate diet AND LOW FAT DIET AND increase in exercise activity  and weight loss program and monitor HbA1C and glucose levels and consider Metformin 500 mg BID with meals              Obstructive sleep apnea  syndrome G47.33       Sleep apnea/nocturnal hypoxia - Not/ Witnessed - The patient is complaining of day-time sleepiness(falling asleep easily/  narcolepsy) while driving or sitting still. Also patient complains of major tiredness/ fatigue, multiple episodes of night time awakenings(unexpalined). Also patient is at high risk for sleep apnea: overweight, small throat opening and enlarged (kissing) tonsils, sedentary lifestyle, sleeping aides. Recommend: Sleep studies: Nocturnal oxymetry, sleep lab. Consider CPAP vs. Oxygen per Nasal Canula at night at 2L/min. for nocturnal hypoxia                         Immunizations/Injections      very important  Immunizations from outside sources need reconciliation.      Flu vaccine, quadrivalent, no egg protein, age 6 month or greater (FLUCELVAX)9/20/2023, 11/4/2022  Influenza, Unspecified1/4/2011  Influenza, injectable, quadrivalent9/26/2018  Novel influenza-H1N1-09, preservative-free12/28/2009  Pfizer COVID-19 vaccine, bivalent, age 12 years and older (30 mcg/0.3 mL)11/4/2022  Pfizer Gray Cap SARS-CoV-29/20/2023  Pfizer Purple Cap SARS-CoV-212/13/2021, 4/20/2021, 3/30/2021  Pneumococcal polysaccharide vaccine, 23-valent, age 2 years and older (PNEUMOVAX 23)6/22/2018  Tdap vaccine, age 7 year and older (BOOSTRIX, ADACEL)3/14/2017

## 2024-05-07 ENCOUNTER — LAB (OUTPATIENT)
Dept: LAB | Facility: LAB | Age: 49
End: 2024-05-07
Payer: MEDICARE

## 2024-05-07 DIAGNOSIS — R53.83 OTHER FATIGUE: ICD-10-CM

## 2024-05-07 DIAGNOSIS — E08.9 DIABETES MELLITUS DUE TO UNDERLYING CONDITION WITHOUT COMPLICATION, WITHOUT LONG-TERM CURRENT USE OF INSULIN (MULTI): ICD-10-CM

## 2024-05-07 DIAGNOSIS — I10 BENIGN ESSENTIAL HYPERTENSION: ICD-10-CM

## 2024-05-07 LAB
ALBUMIN SERPL BCP-MCNC: 4.3 G/DL (ref 3.4–5)
ALP SERPL-CCNC: 75 U/L (ref 33–120)
ALT SERPL W P-5'-P-CCNC: 26 U/L (ref 10–52)
ANION GAP SERPL CALC-SCNC: 16 MMOL/L (ref 10–20)
AST SERPL W P-5'-P-CCNC: 29 U/L (ref 9–39)
BASOPHILS # BLD AUTO: 0.17 X10*3/UL (ref 0–0.1)
BASOPHILS NFR BLD AUTO: 1.3 %
BILIRUB SERPL-MCNC: 0.3 MG/DL (ref 0–1.2)
BUN SERPL-MCNC: 13 MG/DL (ref 6–23)
CALCIUM SERPL-MCNC: 9.2 MG/DL (ref 8.6–10.6)
CHLORIDE SERPL-SCNC: 100 MMOL/L (ref 98–107)
CHOLEST SERPL-MCNC: 202 MG/DL (ref 0–199)
CHOLESTEROL/HDL RATIO: 5.3
CO2 SERPL-SCNC: 28 MMOL/L (ref 21–32)
CREAT SERPL-MCNC: 1.04 MG/DL (ref 0.5–1.3)
EGFRCR SERPLBLD CKD-EPI 2021: 89 ML/MIN/1.73M*2
EOSINOPHIL # BLD AUTO: 0.79 X10*3/UL (ref 0–0.7)
EOSINOPHIL NFR BLD AUTO: 6.2 %
ERYTHROCYTE [DISTWIDTH] IN BLOOD BY AUTOMATED COUNT: 13 % (ref 11.5–14.5)
EST. AVERAGE GLUCOSE BLD GHB EST-MCNC: 126 MG/DL
GLUCOSE SERPL-MCNC: 102 MG/DL (ref 74–99)
HBA1C MFR BLD: 6 %
HCT VFR BLD AUTO: 44.8 % (ref 41–52)
HDLC SERPL-MCNC: 38.3 MG/DL
HGB BLD-MCNC: 15.2 G/DL (ref 13.5–17.5)
IMM GRANULOCYTES # BLD AUTO: 0.06 X10*3/UL (ref 0–0.7)
IMM GRANULOCYTES NFR BLD AUTO: 0.5 % (ref 0–0.9)
LDLC SERPL CALC-MCNC: ABNORMAL MG/DL
LYMPHOCYTES # BLD AUTO: 4.16 X10*3/UL (ref 1.2–4.8)
LYMPHOCYTES NFR BLD AUTO: 32.7 %
MCH RBC QN AUTO: 32.7 PG (ref 26–34)
MCHC RBC AUTO-ENTMCNC: 33.9 G/DL (ref 32–36)
MCV RBC AUTO: 96 FL (ref 80–100)
MONOCYTES # BLD AUTO: 1 X10*3/UL (ref 0.1–1)
MONOCYTES NFR BLD AUTO: 7.8 %
NEUTROPHILS # BLD AUTO: 6.56 X10*3/UL (ref 1.2–7.7)
NEUTROPHILS NFR BLD AUTO: 51.5 %
NON HDL CHOLESTEROL: 164 MG/DL (ref 0–149)
NRBC BLD-RTO: 0 /100 WBCS (ref 0–0)
PLATELET # BLD AUTO: 313 X10*3/UL (ref 150–450)
POTASSIUM SERPL-SCNC: 3.9 MMOL/L (ref 3.5–5.3)
PROT SERPL-MCNC: 6.5 G/DL (ref 6.4–8.2)
RBC # BLD AUTO: 4.65 X10*6/UL (ref 4.5–5.9)
SODIUM SERPL-SCNC: 140 MMOL/L (ref 136–145)
TRIGL SERPL-MCNC: 564 MG/DL (ref 0–149)
TSH SERPL-ACNC: 0.72 MIU/L (ref 0.44–3.98)
VLDL: ABNORMAL
WBC # BLD AUTO: 12.7 X10*3/UL (ref 4.4–11.3)

## 2024-05-07 PROCEDURE — 83036 HEMOGLOBIN GLYCOSYLATED A1C: CPT

## 2024-05-07 PROCEDURE — 84443 ASSAY THYROID STIM HORMONE: CPT

## 2024-05-07 PROCEDURE — 36415 COLL VENOUS BLD VENIPUNCTURE: CPT

## 2024-05-07 PROCEDURE — 85025 COMPLETE CBC W/AUTO DIFF WBC: CPT

## 2024-05-07 PROCEDURE — 80061 LIPID PANEL: CPT

## 2024-05-07 PROCEDURE — 80053 COMPREHEN METABOLIC PANEL: CPT

## 2024-05-21 ENCOUNTER — TELEPHONE (OUTPATIENT)
Dept: PRIMARY CARE | Facility: CLINIC | Age: 49
End: 2024-05-21
Payer: MEDICARE

## 2024-05-23 ENCOUNTER — OFFICE VISIT (OUTPATIENT)
Dept: PRIMARY CARE | Facility: CLINIC | Age: 49
End: 2024-05-23
Payer: MEDICARE

## 2024-05-23 VITALS
WEIGHT: 210 LBS | DIASTOLIC BLOOD PRESSURE: 85 MMHG | RESPIRATION RATE: 16 BRPM | SYSTOLIC BLOOD PRESSURE: 140 MMHG | BODY MASS INDEX: 31.1 KG/M2 | HEART RATE: 78 BPM | HEIGHT: 69 IN

## 2024-05-23 DIAGNOSIS — R53.83 MALAISE AND FATIGUE: ICD-10-CM

## 2024-05-23 DIAGNOSIS — M50.30 DEGENERATIVE DISC DISEASE, CERVICAL: ICD-10-CM

## 2024-05-23 DIAGNOSIS — E78.00 HYPERCHOLESTEREMIA: ICD-10-CM

## 2024-05-23 DIAGNOSIS — F31.81 BIPOLAR II DISORDER (MULTI): ICD-10-CM

## 2024-05-23 DIAGNOSIS — R53.81 MALAISE AND FATIGUE: ICD-10-CM

## 2024-05-23 DIAGNOSIS — E88.9 METABOLIC DISORDER: Primary | ICD-10-CM

## 2024-05-23 DIAGNOSIS — D72.828 OTHER ELEVATED WHITE BLOOD CELL (WBC) COUNT: ICD-10-CM

## 2024-05-23 DIAGNOSIS — I87.2 CHRONIC VENOUS INSUFFICIENCY: ICD-10-CM

## 2024-05-23 DIAGNOSIS — I10 BENIGN ESSENTIAL HYPERTENSION: ICD-10-CM

## 2024-05-23 PROBLEM — D72.829 LEUCOCYTOSIS: Status: ACTIVE | Noted: 2024-05-23

## 2024-05-23 PROBLEM — I47.19 ATRIAL TACHYCARDIA (CMS-HCC): Status: RESOLVED | Noted: 2020-03-16 | Resolved: 2024-05-23

## 2024-05-23 PROBLEM — G61.0: Status: RESOLVED | Noted: 2018-01-30 | Resolved: 2024-05-23

## 2024-05-23 PROBLEM — F11.21 OPIOID DEPENDENCE IN REMISSION (MULTI): Status: RESOLVED | Noted: 2022-01-07 | Resolved: 2024-05-23

## 2024-05-23 PROCEDURE — 1036F TOBACCO NON-USER: CPT | Performed by: INTERNAL MEDICINE

## 2024-05-23 PROCEDURE — 3079F DIAST BP 80-89 MM HG: CPT | Performed by: INTERNAL MEDICINE

## 2024-05-23 PROCEDURE — 3077F SYST BP >= 140 MM HG: CPT | Performed by: INTERNAL MEDICINE

## 2024-05-23 PROCEDURE — 99214 OFFICE O/P EST MOD 30 MIN: CPT | Performed by: INTERNAL MEDICINE

## 2024-05-23 ASSESSMENT — ENCOUNTER SYMPTOMS
GASTROINTESTINAL NEGATIVE: 1
HEMATOLOGIC/LYMPHATIC NEGATIVE: 1
ALLERGIC/IMMUNOLOGIC NEGATIVE: 1
RESPIRATORY NEGATIVE: 1
CARDIOVASCULAR NEGATIVE: 1
MUSCULOSKELETAL NEGATIVE: 1
CONSTITUTIONAL NEGATIVE: 1
PSYCHIATRIC NEGATIVE: 1
ENDOCRINE NEGATIVE: 1
NEUROLOGICAL NEGATIVE: 1
EYES NEGATIVE: 1

## 2024-05-23 NOTE — ASSESSMENT & PLAN NOTE
Educate extensively low carbohydrate diet AND LOW FAT DIET AND increase in exercise activity and weight loss program and monitor HbA1C and glucose levels and consider Metformin 500 mg BID with meals and Ozempic

## 2024-05-23 NOTE — PROGRESS NOTES
"Subjective   Patient ID: Gael Lara is a 48 y.o. male who presents for Follow-up (Follow up on blood work).    HPI     Review of Systems   Constitutional: Negative.    HENT: Negative.     Eyes: Negative.    Respiratory: Negative.     Cardiovascular: Negative.    Gastrointestinal: Negative.    Endocrine: Negative.    Musculoskeletal: Negative.    Skin: Negative.    Allergic/Immunologic: Negative.    Neurological: Negative.    Hematological: Negative.    Psychiatric/Behavioral: Negative.     All other systems reviewed and are negative.      Objective   Ht 1.753 m (5' 9\")   Wt 95.3 kg (210 lb)   BMI 31.01 kg/m²   Blood pressure 140/85, pulse 78, resp. rate 16, height 1.753 m (5' 9\"), weight 95.3 kg (210 lb).   Physical Exam  Vitals and nursing note reviewed.   Constitutional:       Appearance: Normal appearance.   HENT:      Head: Normocephalic and atraumatic.      Right Ear: Tympanic membrane, ear canal and external ear normal.      Left Ear: Tympanic membrane, ear canal and external ear normal. There is no impacted cerumen.      Nose: Nose normal.      Mouth/Throat:      Mouth: Mucous membranes are moist.      Pharynx: Oropharynx is clear.   Eyes:      Extraocular Movements: Extraocular movements intact.      Conjunctiva/sclera: Conjunctivae normal.      Pupils: Pupils are equal, round, and reactive to light.   Cardiovascular:      Rate and Rhythm: Normal rate and regular rhythm.      Pulses: Normal pulses.      Heart sounds: Normal heart sounds. No murmur heard.  Pulmonary:      Effort: Pulmonary effort is normal. No respiratory distress.      Breath sounds: Normal breath sounds. No stridor. No wheezing, rhonchi or rales.   Chest:      Chest wall: No tenderness.   Abdominal:      General: Abdomen is flat. Bowel sounds are normal. There is no distension.      Palpations: Abdomen is soft. There is no mass.      Tenderness: There is no abdominal tenderness. There is no right CVA tenderness, left CVA tenderness, " guarding or rebound.      Hernia: No hernia is present.   Musculoskeletal:         General: Normal range of motion.      Cervical back: Normal range of motion and neck supple.   Skin:     General: Skin is warm.      Capillary Refill: Capillary refill takes less than 2 seconds.   Neurological:      General: No focal deficit present.      Mental Status: He is alert.      Cranial Nerves: No cranial nerve deficit.      Sensory: No sensory deficit.      Motor: No weakness.      Coordination: Coordination normal.      Gait: Gait normal.      Deep Tendon Reflexes: Reflexes normal.   Psychiatric:         Mood and Affect: Mood normal.         Behavior: Behavior normal. Behavior is cooperative.         Thought Content: Thought content normal.         Judgment: Judgment normal.         Assessment/Plan   Problem List Items Addressed This Visit             ICD-10-CM    Benign essential hypertension - Primary I10     HTN - hypertension well/controlled .Target BP < 130/80 achieved. Educate low salt diet and exercise with weight loss. Educate home self monitoring and diary keeping. Educate risks of elevate blood pressure and benefits of prompt treatment. Refill hydrochlorothiazide          Bipolar II disorder (Multi) F31.81    Chronic venous insufficiency I87.2     Edema - and leg swelling dur to venous insufficiency - responding to low dose Furosemide and recommend: leg elevation and compression stockings -          Degenerative disc disease, cervical M50.30     DDD - Degenerative disc disease of the )/Cervical (C)  spine. Educate exercises and referred patient to Physical Therapy (PT). Ordered X-Ray's of the /C spine. Consider MRI. Radiculopathy in the distribution of C4-C5-C6 nerve roots, needs NSAIDS (Naprosin 500mg BID vs. Arthrotec 75mg BID or Prednisone taper (Medrol dose pack), Flexeril 10 mg qHS, heat application, and pain control with Tylenol vs. Vicodin 5/325 mg TID.           Malaise and fatigue R53.81, R53.83      Fatigue - check CMP(metabolic panel and elctrolytes) , CBC(complete blood cell count), TSH(thyroid function). Insomnia may play a role and sleep studies(rule out sleep apnea) are recommended . Educate sleep hygiene. Consider anxiety disorder vs. depression. Consider Stress test, and 2DECHO.           Metabolic disorder E88.9     Educate extensively low carbohydrate diet AND LOW FAT DIET AND increase in exercise activity and weight loss program and monitor HbA1C and glucose levels and consider Metformin 500 mg BID with meals and Ozempic                Benign essential hypertension I10        HTN - hypertension well/controlled .Target BP < 130/80  achieved. Educate low salt diet and exercise with weight loss. Educate home self monitoring and diary keeping. Educate risks of elevate blood pressure and benefits of prompt treatment.  Refill hydrochlorothiazide            Relevant Medications     hydroCHLOROthiazide (Microzide) 12.5 mg tablet     Other Relevant Orders     Comprehensive Metabolic Panel     Mouth sores K13.79     Relevant Medications     valACYclovir (Valtrex) 1 gram tablet     doxycycline (Monodox) 100 mg capsule     Chronic venous insufficiency - Primary I87.2       Edema - and leg swelling dur to venous insufficiency - responding to low dose Furosemide and recommend: leg elevation and compression stockings -            Hypogonadism male E29.1       Hypogonadism  - monitor  Testosterone level  - and supplement 300 mg IM  - now  - inject Testosterone 300 mg IM. Repeat on a monthly basis. Monitor Testosterone level and symptoms ( fatigue, decrease libido, muscle weakness). Educate the patient upon the risks of Stroke, heart attacks and possible venous thromboembolism with possible pulmonary embolism and even death associated with Testosterone treatment .  The patient understood the risks associated with Testosterone treatment and whishes to continue the Testosterone supplement and aggress with a plan to  control tightly the Blood pressure and cholesterol and take a Baby aspirin 81 mg daily to prevent these complications. He enjoys the benefits of the Testosterone. Also check Prolactin and LH and FSH levels to rule out pituitary adenoma and prolactinoma.            Idiopathic peripheral neuropathy G60.9       Neuropathy/ - positive numbness, tingling, discomfort (needles pricking, cold feeling) in distal lower extremities(toes, feet, legs), secondary to DM/Radiculopathy/idiopathic. Recommend: Gabapentin(Neurontin) 300 daily(at bed time) upward taper up to  2 gm/day or Lyrica 75 mg daily if failure of treatment. Also recommend: treatment of Diabetes(control levels of blood sugars), lumbar/ cervical  disc disease (Physical Therapy), and check electrolytes and Thyroid function. Also address regenrative measures: B12 intrmusculary (Monthly) and Folic acid supplementation. Recommend  EMG. Start Amitriptyline 25 mg daily / . Start - Tonic water - and Tramadol 50 mg TID.             Immunosuppression (Multi) D84.9       Needs vaccinations and Prophylactic treatment            Insomnia G47.00     Metabolic disorder E88.9       Educate extensively low carbohydrate diet AND LOW FAT DIET AND increase in exercise activity and weight loss program and monitor HbA1C and glucose levels and consider Metformin 500 mg BID with meals            Obstructive sleep apnea syndrome G47.33      Other Visit Diagnoses           Codes     Diabetes mellitus due to underlying condition without complication, without long-term current use of insulin (Multi)     E08.9     Relevant Medications     metFORMIN (Glucophage) 500 mg tablet     Other Relevant Orders     Lipid Panel     Hemoglobin A1C     Other fatigue     R53.83     Relevant Orders     CBC and Auto Differential     TSH with reflex to Free T4 if abnormal                       Abnormal kidney function N28.9          CRI - Chronic Renal Insuficiency. Secondary to DM/HTN/Atherosclerosis. Follow  BUN/Cr. and lytes.   RENOPROTECTION with ACEI/ARB (). Monitor microalbuminuria. Avoid hypotension and renal hypoperfusion. The patient was instructed to avoid NSAIDS and educate compliance with medications to control HTN(hypertension) and cholesterol and diabetes.                                       Benign essential hypertension I10        HTN - hypertension well/controlled .Target BP < 130/80  achieved. Educate low salt diet and exercise with weight loss. Educate home self monitoring and diary keeping. Educate risks of elevate blood pressure and benefits of prompt treatment.  Refill hydrochlorothiazide            Mouth sores K13.79      Chronic venous insufficiency I87.2        Edema - and leg swelling dur to venous insufficiency - responding to low dose Furosemide and recommend: leg elevation and compression stockings -            Immunosuppression (CMS/HCC) D84.9        Needs vaccinations and Prophylactic treatment            Infectious mononucleosis B27.90        Chronic fatigue syndrome and consider Prednisone taper PRN and antiviral            Malaise and fatigue R53.81, R53.83        Fatigue - check CMP(metabolic panel and elctrolytes) , CBC(complete blood cell count), TSH(thyroid function). Insomnia may play a role and sleep studies(rule out sleep apnea) are recommended . Educate sleep hygiene. Consider anxiety disorder vs. depression. Consider Stress test, and 2DECHO.             Metabolic disorder - Primary E88.9        Educate extensively low carbohydrate diet AND LOW FAT DIET AND increase in exercise activity  and weight loss program and monitor HbA1C and glucose levels and consider Metformin 500 mg BID with meals              Obstructive sleep apnea syndrome G47.33        Sleep apnea/nocturnal hypoxia - Not/ Witnessed - The patient is complaining of day-time sleepiness(falling asleep easily/  narcolepsy) while driving or sitting still. Also patient complains of major tiredness/ fatigue, multiple  episodes of night time awakenings(unexpalined). Also patient is at high risk for sleep apnea: overweight, small throat opening and enlarged (kissing) tonsils, sedentary lifestyle, sleeping aides. Recommend: Sleep studies: Nocturnal oxymetry, sleep lab. Consider CPAP vs. Oxygen per Nasal Canula at night at 2L/min. for nocturnal hypoxia                          Immunizations/Injections       very important  Immunizations from outside sources need reconciliation.       Flu vaccine, quadrivalent, no egg protein, age 6 month or greater (FLUCELVAX)9/20/2023, 11/4/2022  Influenza, Unspecified1/4/2011  Influenza, injectable, quadrivalent9/26/2018  Novel influenza-H1N1-09, preservative-free12/28/2009  Pfizer COVID-19 vaccine, bivalent, age 12 years and older (30 mcg/0.3 mL)11/4/2022  Pfizer Gray Cap SARS-CoV-29/20/2023  Pfizer Purple Cap SARS-CoV-212/13/2021, 4/20/2021, 3/30/2021  Pneumococcal polysaccharide vaccine, 23-valent, age 2 years and older (PNEUMOVAX 23)6/22/2018  Tdap vaccine, age 7 year and older (BOOSTRIX, ADACEL)3/14/2017                 Immunizations/Injections      very important  Immunizations from outside sources need reconciliation.      Flu vaccine, quadrivalent, no egg protein, age 6 month or greater (FLUCELVAX)9/20/2023, 11/4/2022  Influenza, Unspecified1/4/2011  Influenza, injectable, quadrivalent9/26/2018  Novel influenza-H1N1-09, preservative-free12/28/2009  Pfizer COVID-19 vaccine, bivalent, age 12 years and older (30 mcg/0.3 mL)11/4/2022  Pfizer Gray Cap SARS-CoV-29/20/2023  Pfizer Purple Cap SARS-CoV-212/13/2021, 4/20/2021, 3/30/2021  Pneumococcal polysaccharide vaccine, 23-valent, age 2 years and older (PNEUMOVAX 23)6/22/2018  Tdap vaccine, age 7 year and older (BOOSTRIX, ADACEL)3/14/2017

## 2024-05-23 NOTE — ASSESSMENT & PLAN NOTE
DDD - Degenerative disc disease of the )/Cervical (C)  spine. Educate exercises and referred patient to Physical Therapy (PT). Ordered X-Ray's of the /C spine. Consider MRI. Radiculopathy in the distribution of C4-C5-C6 nerve roots, needs NSAIDS (Naprosin 500mg BID vs. Arthrotec 75mg BID or Prednisone taper (Medrol dose pack), Flexeril 10 mg qHS, heat application, and pain control with Tylenol vs. Vicodin 5/325 mg TID.

## 2024-06-14 ENCOUNTER — LAB (OUTPATIENT)
Dept: LAB | Facility: LAB | Age: 49
End: 2024-06-14
Payer: MEDICARE

## 2024-06-14 DIAGNOSIS — E88.9 METABOLIC DISORDER: ICD-10-CM

## 2024-06-14 DIAGNOSIS — E78.00 HYPERCHOLESTEREMIA: ICD-10-CM

## 2024-06-14 LAB
BASOPHILS # BLD AUTO: 0.11 X10*3/UL (ref 0–0.1)
BASOPHILS NFR BLD AUTO: 0.9 %
CHOLEST SERPL-MCNC: 185 MG/DL (ref 0–199)
CHOLESTEROL/HDL RATIO: 4.9
EOSINOPHIL # BLD AUTO: 0.36 X10*3/UL (ref 0–0.7)
EOSINOPHIL NFR BLD AUTO: 3.1 %
ERYTHROCYTE [DISTWIDTH] IN BLOOD BY AUTOMATED COUNT: 13.4 % (ref 11.5–14.5)
HCT VFR BLD AUTO: 48.7 % (ref 41–52)
HDLC SERPL-MCNC: 37.4 MG/DL
HGB BLD-MCNC: 15.6 G/DL (ref 13.5–17.5)
IMM GRANULOCYTES # BLD AUTO: 0.02 X10*3/UL (ref 0–0.7)
IMM GRANULOCYTES NFR BLD AUTO: 0.2 % (ref 0–0.9)
LDLC SERPL CALC-MCNC: ABNORMAL MG/DL
LYMPHOCYTES # BLD AUTO: 3.71 X10*3/UL (ref 1.2–4.8)
LYMPHOCYTES NFR BLD AUTO: 31.5 %
MCH RBC QN AUTO: 32.4 PG (ref 26–34)
MCHC RBC AUTO-ENTMCNC: 32 G/DL (ref 32–36)
MCV RBC AUTO: 101 FL (ref 80–100)
MONOCYTES # BLD AUTO: 0.81 X10*3/UL (ref 0.1–1)
MONOCYTES NFR BLD AUTO: 6.9 %
NEUTROPHILS # BLD AUTO: 6.76 X10*3/UL (ref 1.2–7.7)
NEUTROPHILS NFR BLD AUTO: 57.4 %
NON HDL CHOLESTEROL: 148 MG/DL (ref 0–149)
NRBC BLD-RTO: 0 /100 WBCS (ref 0–0)
PLATELET # BLD AUTO: 352 X10*3/UL (ref 150–450)
RBC # BLD AUTO: 4.82 X10*6/UL (ref 4.5–5.9)
TRIGL SERPL-MCNC: 402 MG/DL (ref 0–149)
VLDL: ABNORMAL
WBC # BLD AUTO: 11.8 X10*3/UL (ref 4.4–11.3)

## 2024-06-14 PROCEDURE — 85025 COMPLETE CBC W/AUTO DIFF WBC: CPT

## 2024-06-14 PROCEDURE — 36415 COLL VENOUS BLD VENIPUNCTURE: CPT

## 2024-06-14 PROCEDURE — 80061 LIPID PANEL: CPT

## 2024-06-18 DIAGNOSIS — E78.2 ELEVATED TRIGLYCERIDES WITH HIGH CHOLESTEROL: ICD-10-CM

## 2024-06-18 DIAGNOSIS — R11.0 NAUSEA: ICD-10-CM

## 2024-06-18 RX ORDER — GEMFIBROZIL 600 MG/1
600 TABLET, FILM COATED ORAL DAILY
Qty: 30 TABLET | Refills: 5 | Status: SHIPPED | OUTPATIENT
Start: 2024-06-18 | End: 2024-12-15

## 2024-06-18 RX ORDER — ONDANSETRON 4 MG/1
4 TABLET, ORALLY DISINTEGRATING ORAL EVERY 8 HOURS PRN
Qty: 20 TABLET | Refills: 0 | Status: SHIPPED | OUTPATIENT
Start: 2024-06-18 | End: 2024-06-25

## 2024-07-19 DIAGNOSIS — R52 PAIN: ICD-10-CM

## 2024-07-19 RX ORDER — BACLOFEN 5 MG/1
5 TABLET ORAL 3 TIMES DAILY
Qty: 270 TABLET | Refills: 0 | Status: SHIPPED | OUTPATIENT
Start: 2024-07-19

## 2024-08-01 DIAGNOSIS — U07.1 COVID-19 VIRUS INFECTION: ICD-10-CM

## 2024-08-01 RX ORDER — NIRMATRELVIR AND RITONAVIR 300-100 MG
3 KIT ORAL 2 TIMES DAILY
Qty: 30 TABLET | Refills: 0 | Status: SHIPPED | OUTPATIENT
Start: 2024-08-01 | End: 2024-08-06

## 2024-09-05 ENCOUNTER — APPOINTMENT (OUTPATIENT)
Dept: PRIMARY CARE | Facility: CLINIC | Age: 49
End: 2024-09-05
Payer: MEDICARE

## 2024-09-12 ENCOUNTER — APPOINTMENT (OUTPATIENT)
Dept: PRIMARY CARE | Facility: CLINIC | Age: 49
End: 2024-09-12
Payer: MEDICARE

## 2024-09-12 VITALS
OXYGEN SATURATION: 93 % | RESPIRATION RATE: 17 BRPM | HEIGHT: 69 IN | SYSTOLIC BLOOD PRESSURE: 130 MMHG | HEART RATE: 75 BPM | BODY MASS INDEX: 30.81 KG/M2 | WEIGHT: 208 LBS | DIASTOLIC BLOOD PRESSURE: 80 MMHG

## 2024-09-12 DIAGNOSIS — G60.9 IDIOPATHIC PERIPHERAL NEUROPATHY: ICD-10-CM

## 2024-09-12 DIAGNOSIS — E29.1 HYPOGONADISM MALE: ICD-10-CM

## 2024-09-12 DIAGNOSIS — F51.01 PRIMARY INSOMNIA: ICD-10-CM

## 2024-09-12 DIAGNOSIS — R73.9 HYPERGLYCEMIA: ICD-10-CM

## 2024-09-12 DIAGNOSIS — E88.9 METABOLIC DISORDER: ICD-10-CM

## 2024-09-12 DIAGNOSIS — R79.89 DECREASED THYROID STIMULATING HORMONE (TSH) LEVEL: ICD-10-CM

## 2024-09-12 DIAGNOSIS — Z00.00 MEDICARE ANNUAL WELLNESS VISIT, SUBSEQUENT: ICD-10-CM

## 2024-09-12 DIAGNOSIS — Z00.00 ROUTINE GENERAL MEDICAL EXAMINATION AT HEALTH CARE FACILITY: Primary | ICD-10-CM

## 2024-09-12 DIAGNOSIS — R53.83 OTHER FATIGUE: ICD-10-CM

## 2024-09-12 DIAGNOSIS — I10 BENIGN ESSENTIAL HYPERTENSION: ICD-10-CM

## 2024-09-12 DIAGNOSIS — E78.00 HYPERCHOLESTEREMIA: ICD-10-CM

## 2024-09-12 DIAGNOSIS — I87.2 CHRONIC VENOUS INSUFFICIENCY: ICD-10-CM

## 2024-09-12 DIAGNOSIS — R52 PAIN: ICD-10-CM

## 2024-09-12 DIAGNOSIS — Z12.11 COLON CANCER SCREENING: ICD-10-CM

## 2024-09-12 PROCEDURE — G0439 PPPS, SUBSEQ VISIT: HCPCS | Performed by: INTERNAL MEDICINE

## 2024-09-12 PROCEDURE — 1036F TOBACCO NON-USER: CPT | Performed by: INTERNAL MEDICINE

## 2024-09-12 PROCEDURE — 99214 OFFICE O/P EST MOD 30 MIN: CPT | Performed by: INTERNAL MEDICINE

## 2024-09-12 PROCEDURE — 3075F SYST BP GE 130 - 139MM HG: CPT | Performed by: INTERNAL MEDICINE

## 2024-09-12 PROCEDURE — 3079F DIAST BP 80-89 MM HG: CPT | Performed by: INTERNAL MEDICINE

## 2024-09-12 PROCEDURE — 3008F BODY MASS INDEX DOCD: CPT | Performed by: INTERNAL MEDICINE

## 2024-09-12 RX ORDER — BACLOFEN 5 MG/1
5 TABLET ORAL 3 TIMES DAILY
Qty: 270 TABLET | Refills: 0 | Status: SHIPPED | OUTPATIENT
Start: 2024-09-12

## 2024-09-12 RX ORDER — BREXPIPRAZOLE 2 MG/1
1 TABLET ORAL
COMMUNITY
Start: 2024-08-28

## 2024-09-12 RX ORDER — HYDROCHLOROTHIAZIDE 12.5 MG/1
12.5 TABLET ORAL EVERY MORNING
Qty: 90 TABLET | Refills: 0 | Status: SHIPPED | OUTPATIENT
Start: 2024-09-12

## 2024-09-12 RX ORDER — DEXTROAMPHETAMINE SULFATE 30 MG/1
1 TABLET ORAL
COMMUNITY
Start: 2024-06-26

## 2024-09-12 ASSESSMENT — ENCOUNTER SYMPTOMS
LOSS OF SENSATION IN FEET: 0
RESPIRATORY NEGATIVE: 1
CONSTITUTIONAL NEGATIVE: 1
GASTROINTESTINAL NEGATIVE: 1
HEMATOLOGIC/LYMPHATIC NEGATIVE: 1
ENDOCRINE NEGATIVE: 1
CARDIOVASCULAR NEGATIVE: 1
PSYCHIATRIC NEGATIVE: 1
ALLERGIC/IMMUNOLOGIC NEGATIVE: 1
MUSCULOSKELETAL NEGATIVE: 1
DEPRESSION: 0
NEUROLOGICAL NEGATIVE: 1
EYES NEGATIVE: 1
OCCASIONAL FEELINGS OF UNSTEADINESS: 0

## 2024-09-12 ASSESSMENT — ACTIVITIES OF DAILY LIVING (ADL)
DRESSING: INDEPENDENT
DOING_HOUSEWORK: INDEPENDENT
MANAGING_FINANCES: INDEPENDENT
TAKING_MEDICATION: INDEPENDENT
GROCERY_SHOPPING: INDEPENDENT
BATHING: INDEPENDENT

## 2024-09-12 ASSESSMENT — PATIENT HEALTH QUESTIONNAIRE - PHQ9
2. FEELING DOWN, DEPRESSED OR HOPELESS: NOT AT ALL
SUM OF ALL RESPONSES TO PHQ9 QUESTIONS 1 AND 2: 0
1. LITTLE INTEREST OR PLEASURE IN DOING THINGS: NOT AT ALL

## 2024-09-12 NOTE — PROGRESS NOTES
"Subjective   Patient ID: Gael Lara is a 48 y.o. male who presents for Medicare Annual Wellness Visit Subsequent (MCR/Notes needed for cpap benefit ).    HPI     Review of Systems   Constitutional: Negative.    HENT: Negative.     Eyes: Negative.    Respiratory: Negative.     Cardiovascular: Negative.    Gastrointestinal: Negative.    Endocrine: Negative.    Musculoskeletal: Negative.    Skin: Negative.    Allergic/Immunologic: Negative.    Neurological: Negative.    Hematological: Negative.    Psychiatric/Behavioral: Negative.     All other systems reviewed and are negative.      Objective   Pulse 75   Resp 17   Ht 1.753 m (5' 9\")   Wt 94.3 kg (208 lb)   SpO2 93%   BMI 30.72 kg/m²   Blood pressure 130/80, pulse 75, resp. rate 17, height 1.753 m (5' 9\"), weight 94.3 kg (208 lb), SpO2 93%.   Physical Exam  Vitals and nursing note reviewed.   Constitutional:       Appearance: Normal appearance.   HENT:      Head: Normocephalic and atraumatic.      Right Ear: Tympanic membrane, ear canal and external ear normal.      Left Ear: Tympanic membrane, ear canal and external ear normal. There is no impacted cerumen.      Nose: Nose normal.      Mouth/Throat:      Mouth: Mucous membranes are moist.      Pharynx: Oropharynx is clear.   Eyes:      Extraocular Movements: Extraocular movements intact.      Conjunctiva/sclera: Conjunctivae normal.      Pupils: Pupils are equal, round, and reactive to light.   Cardiovascular:      Rate and Rhythm: Normal rate and regular rhythm.      Pulses: Normal pulses.      Heart sounds: Normal heart sounds. No murmur heard.  Pulmonary:      Effort: Pulmonary effort is normal. No respiratory distress.      Breath sounds: Normal breath sounds. No stridor. No wheezing, rhonchi or rales.   Chest:      Chest wall: No tenderness.   Abdominal:      General: Abdomen is flat. Bowel sounds are normal. There is no distension.      Palpations: Abdomen is soft. There is no mass.      Tenderness: " There is no abdominal tenderness. There is no right CVA tenderness, left CVA tenderness, guarding or rebound.      Hernia: No hernia is present.   Musculoskeletal:         General: Normal range of motion.      Cervical back: Normal range of motion and neck supple.   Skin:     General: Skin is warm.      Capillary Refill: Capillary refill takes less than 2 seconds.   Neurological:      General: No focal deficit present.      Mental Status: He is alert.      Cranial Nerves: No cranial nerve deficit.      Sensory: No sensory deficit.      Motor: No weakness.      Coordination: Coordination normal.      Gait: Gait normal.      Deep Tendon Reflexes: Reflexes normal.   Psychiatric:         Mood and Affect: Mood normal.         Behavior: Behavior normal. Behavior is cooperative.         Thought Content: Thought content normal.         Judgment: Judgment normal.         Assessment/Plan   Problem List Items Addressed This Visit             ICD-10-CM    Benign essential hypertension I10     HTN - hypertension well/controlled .Target BP < 130/80 achieved. Educate low salt diet and exercise with weight loss. Educate home self monitoring and diary keeping. Educate risks of elevate blood pressure and benefits of prompt treatment. Refill hydrochlorothiazide          Relevant Medications    hydroCHLOROthiazide (Microzide) 12.5 mg tablet    Chronic venous insufficiency I87.2     Edema - and leg swelling dur to venous insufficiency - responding to low dose Furosemide and recommend: leg elevation and compression stockings -          Decreased thyroid stimulating hormone (TSH) level R79.89     Hypothyroidism - Symptoms well/not controlled (weight gain, fatigue, constipation, cold intolerance). Check TSH continue/change dose of Synthroid/Levothyroxine  of  mcg/qD.          Hypogonadism male E29.1     Hypogonadism - monitor Testosterone level - and supplement 300 mg IM - now - inject Testosterone 300 mg IM. Repeat on a monthly basis.  Monitor Testosterone level and symptoms ( fatigue, decrease libido, muscle weakness). Educate the patient upon the risks of Stroke, heart attacks and possible venous thromboembolism with possible pulmonary embolism and even death associated with Testosterone treatment . The patient understood the risks associated with Testosterone treatment and whishes to continue the Testosterone supplement and aggress with a plan to control tightly the Blood pressure and cholesterol and take a Baby aspirin 81 mg daily to prevent these complications. He enjoys the benefits of the Testosterone. Also check Prolactin and LH and FSH levels to rule out pituitary adenoma and prolactinoma.          Idiopathic peripheral neuropathy G60.9     Neuropathy/ - positive numbness, tingling, discomfort (needles pricking, cold feeling) in distal lower extremities(toes, feet, legs), secondary to DM/Radiculopathy/idiopathic. Recommend: Gabapentin(Neurontin) 300 daily(at bed time) upward taper up to 2 gm/day or Lyrica 75 mg daily if failure of treatment. Also recommend: treatment of Diabetes(control levels of blood sugars), lumbar/ cervical disc disease (Physical Therapy), and check electrolytes and Thyroid function. Also address regenrative measures: B12 intrmusculary (Monthly) and Folic acid supplementation. Recommend EMG. Start Amitriptyline 25 mg daily / . Start - Tonic water - and Tramadol 50 mg TID.          Insomnia G47.00    Metabolic disorder E88.9     Educate extensively low carbohydrate diet AND LOW FAT DIET AND increase in exercise activity  and weight loss program and monitor HbA1C and glucose levels and consider Metformin 500 mg BID with meals            Pain R52    Relevant Medications    baclofen (Lioresal) 5 mg tablet    Hypercholesteremia E78.00     Hypercholesterolemia - Monitor lipid profile and educate patient upon risks of high cholesterol and targets. Educate diet and change in lifestyle and increase in exercises - Refill:   and  educate compliance with medication and diet.           Relevant Orders    Lipid Panel    Medicare annual wellness visit, subsequent - Primary Z00.00     No recent hospitalizations.    All medications reviewed and reconciled by me the provider..  No use of controlled substances or opiates.    Family history, social history reviewed, no changes.    Patient does not smoke.    Patient does not drink.    Patient hydrates adequately daily.  Eats a well-balanced healthy diet.     Exercises adequately including walking and doing weightbearing exercises.    Patient denies any difficulty with memory or cognition.     Denies any difficulty with hearing.  Patient does not wear hearing aids.    No fall risk.  No recent falls.  Denies any difficulty walking.    Patient with no history of depression anxiety, denies any loss of interest, no feeling of sadness, no lack of motivation.    Patient is independent in all ADLs and IADLs.  Independent bathing, dressing, walking.  Takes care of own finances, shopping and cooking.     End-of-life decision-making power of  reviewed with patient.     Risk Factors Identified During Visit: None.   Influenza: influenza vaccine was previously given.   Pneumovax 23: Pneumovax 23 vaccine was previously given.   Prevnar 13: Prevnar 13 vaccine was previously given.   Shingles Vaccine: Shingles vaccine was previously given.   Prostate cancer screening: Screening is current.   Colorectal Cancer Screening: screening is current.   Abdominal Aortic Aneurysm screening: screening is current.   HIV screening: screening not indicated.       Preventive measures - Recommend ASAP : Refer to GI for  Colonoscopy (educate patient risks of colon cancer) refer patient to GI specialist. Ophthalmology and retina exam recommend yearly exams refer patient to an Ophthalmologist. BPH - (Benign Prostatic Hypertrophy) refer patient to an Urologist for rectal exam and PSA check.          Relevant Orders    Prostate  Specific Antigen     Other Visit Diagnoses         Codes    Colon cancer screening     Z12.11    Relevant Orders    Colonoscopy Screening; Average Risk Patient    Other fatigue     R53.83    Relevant Orders    CBC and Auto Differential    Comprehensive Metabolic Panel    TSH with reflex to Free T4 if abnormal    Hyperglycemia     R73.9    Relevant Orders    Hemoglobin A1C               Benign essential hypertension - Primary I10        HTN - hypertension well/controlled .Target BP < 130/80 achieved. Educate low salt diet and exercise with weight loss. Educate home self monitoring and diary keeping. Educate risks of elevate blood pressure and benefits of prompt treatment. Refill hydrochlorothiazide            Bipolar II disorder (Multi) F31.81     Chronic venous insufficiency I87.2       Edema - and leg swelling dur to venous insufficiency - responding to low dose Furosemide and recommend: leg elevation and compression stockings -            Degenerative disc disease, cervical M50.30       DDD - Degenerative disc disease of the )/Cervical (C)  spine. Educate exercises and referred patient to Physical Therapy (PT). Ordered X-Ray's of the /C spine. Consider MRI. Radiculopathy in the distribution of C4-C5-C6 nerve roots, needs NSAIDS (Naprosin 500mg BID vs. Arthrotec 75mg BID or Prednisone taper (Medrol dose pack), Flexeril 10 mg qHS, heat application, and pain control with Tylenol vs. Vicodin 5/325 mg TID.             Malaise and fatigue R53.81, R53.83       Fatigue - check CMP(metabolic panel and elctrolytes) , CBC(complete blood cell count), TSH(thyroid function). Insomnia may play a role and sleep studies(rule out sleep apnea) are recommended . Educate sleep hygiene. Consider anxiety disorder vs. depression. Consider Stress test, and 2DECHO.             Metabolic disorder E88.9       Educate extensively low carbohydrate diet AND LOW FAT DIET AND increase in exercise activity and weight loss program and monitor HbA1C  and glucose levels and consider Metformin 500 mg BID with meals and Ozempic                       Benign essential hypertension I10          HTN - hypertension well/controlled .Target BP < 130/80  achieved. Educate low salt diet and exercise with weight loss. Educate home self monitoring and diary keeping. Educate risks of elevate blood pressure and benefits of prompt treatment.  Refill hydrochlorothiazide            Relevant Medications      hydroCHLOROthiazide (Microzide) 12.5 mg tablet      Other Relevant Orders      Comprehensive Metabolic Panel      Mouth sores K13.79      Relevant Medications      valACYclovir (Valtrex) 1 gram tablet      doxycycline (Monodox) 100 mg capsule      Chronic venous insufficiency - Primary I87.2        Edema - and leg swelling dur to venous insufficiency - responding to low dose Furosemide and recommend: leg elevation and compression stockings -            Hypogonadism male E29.1        Hypogonadism  - monitor  Testosterone level  - and supplement 300 mg IM  - now  - inject Testosterone 300 mg IM. Repeat on a monthly basis. Monitor Testosterone level and symptoms ( fatigue, decrease libido, muscle weakness). Educate the patient upon the risks of Stroke, heart attacks and possible venous thromboembolism with possible pulmonary embolism and even death associated with Testosterone treatment .  The patient understood the risks associated with Testosterone treatment and whishes to continue the Testosterone supplement and aggress with a plan to control tightly the Blood pressure and cholesterol and take a Baby aspirin 81 mg daily to prevent these complications. He enjoys the benefits of the Testosterone. Also check Prolactin and LH and FSH levels to rule out pituitary adenoma and prolactinoma.            Idiopathic peripheral neuropathy G60.9        Neuropathy/ - positive numbness, tingling, discomfort (needles pricking, cold feeling) in distal lower extremities(toes, feet, legs),  secondary to DM/Radiculopathy/idiopathic. Recommend: Gabapentin(Neurontin) 300 daily(at bed time) upward taper up to  2 gm/day or Lyrica 75 mg daily if failure of treatment. Also recommend: treatment of Diabetes(control levels of blood sugars), lumbar/ cervical  disc disease (Physical Therapy), and check electrolytes and Thyroid function. Also address regenrative measures: B12 intrmusculary (Monthly) and Folic acid supplementation. Recommend  EMG. Start Amitriptyline 25 mg daily / . Start - Tonic water - and Tramadol 50 mg TID.             Immunosuppression (Multi) D84.9        Needs vaccinations and Prophylactic treatment            Insomnia G47.00      Metabolic disorder E88.9        Educate extensively low carbohydrate diet AND LOW FAT DIET AND increase in exercise activity and weight loss program and monitor HbA1C and glucose levels and consider Metformin 500 mg BID with meals            Obstructive sleep apnea syndrome G47.33       Other Visit Diagnoses           Codes     Diabetes mellitus due to underlying condition without complication, without long-term current use of insulin (Multi)     E08.9     Relevant Medications     metFORMIN (Glucophage) 500 mg tablet     Other Relevant Orders     Lipid Panel     Hemoglobin A1C     Other fatigue     R53.83     Relevant Orders     CBC and Auto Differential     TSH with reflex to Free T4 if abnormal                            Abnormal kidney function N28.9           CRI - Chronic Renal Insuficiency. Secondary to DM/HTN/Atherosclerosis. Follow BUN/Cr. and lytes.   RENOPROTECTION with ACEI/ARB (). Monitor microalbuminuria. Avoid hypotension and renal hypoperfusion. The patient was instructed to avoid NSAIDS and educate compliance with medications to control HTN(hypertension) and cholesterol and diabetes.                                        Benign essential hypertension I10         HTN - hypertension well/controlled .Target BP < 130/80  achieved. Educate low salt diet  and exercise with weight loss. Educate home self monitoring and diary keeping. Educate risks of elevate blood pressure and benefits of prompt treatment.  Refill hydrochlorothiazide             Mouth sores K13.79       Chronic venous insufficiency I87.2         Edema - and leg swelling dur to venous insufficiency - responding to low dose Furosemide and recommend: leg elevation and compression stockings -             Immunosuppression (CMS/HCC) D84.9         Needs vaccinations and Prophylactic treatment             Infectious mononucleosis B27.90         Chronic fatigue syndrome and consider Prednisone taper PRN and antiviral             Malaise and fatigue R53.81, R53.83         Fatigue - check CMP(metabolic panel and elctrolytes) , CBC(complete blood cell count), TSH(thyroid function). Insomnia may play a role and sleep studies(rule out sleep apnea) are recommended . Educate sleep hygiene. Consider anxiety disorder vs. depression. Consider Stress test, and 2DECHO.              Metabolic disorder - Primary E88.9         Educate extensively low carbohydrate diet AND LOW FAT DIET AND increase in exercise activity  and weight loss program and monitor HbA1C and glucose levels and consider Metformin 500 mg BID with meals               Obstructive sleep apnea syndrome G47.33         Sleep apnea/nocturnal hypoxia - Not/ Witnessed - The patient is complaining of day-time sleepiness(falling asleep easily/  narcolepsy) while driving or sitting still. Also patient complains of major tiredness/ fatigue, multiple episodes of night time awakenings(unexpalined). Also patient is at high risk for sleep apnea: overweight, small throat opening and enlarged (kissing) tonsils, sedentary lifestyle, sleeping aides. Recommend: Sleep studies: Nocturnal oxymetry, sleep lab. Consider CPAP vs. Oxygen per Nasal Canula at night at 2L/min. for nocturnal hypoxia                           Immunizations/Injections       very important  Immunizations  from outside sources need reconciliation.       Flu vaccine, quadrivalent, no egg protein, age 6 month or greater (FLUCELVAX)9/20/2023, 11/4/2022  Influenza, Unspecified1/4/2011  Influenza, injectable, quadrivalent9/26/2018  Novel influenza-H1N1-09, preservative-free12/28/2009  Pfizer COVID-19 vaccine, bivalent, age 12 years and older (30 mcg/0.3 mL)11/4/2022  Pfizer Gray Cap SARS-CoV-29/20/2023  Pfizer Purple Cap SARS-CoV-212/13/2021, 4/20/2021, 3/30/2021  Pneumococcal polysaccharide vaccine, 23-valent, age 2 years and older (PNEUMOVAX 23)6/22/2018  Tdap vaccine, age 7 year and older (BOOSTRIX, ADACEL)3/14/2017                   Immunizations/Injections       very important  Immunizations from outside sources need reconciliation.       Flu vaccine, quadrivalent, no egg protein, age 6 month or greater (FLUCELVAX)9/20/2023, 11/4/2022  Influenza, Unspecified1/4/2011  Influenza, injectable, quadrivalent9/26/2018  Novel influenza-H1N1-09, preservative-free12/28/2009  Pfizer COVID-19 vaccine, bivalent, age 12 years and older (30 mcg/0.3 mL)11/4/2022  Pfizer Gray Cap SARS-CoV-29/20/2023  Pfizer Purple Cap SARS-CoV-212/13/2021, 4/20/2021, 3/30/2021  Pneumococcal polysaccharide vaccine, 23-valent, age 2 years and older (PNEUMOVAX 23)6/22/2018  Tdap vaccine, age 7 year and older (BOOSTRIX, ADACEL)3/14/2017                Immunizations/Injections      very important  Immunizations from outside sources need reconciliation.      Flu vaccine, quadrivalent, no egg protein, age 6 month or greater (FLUCELVAX)9/20/2023, 11/4/2022  Influenza, Unspecified1/4/2011  Influenza, injectable, quadrivalent9/26/2018  Novel influenza-H1N1-09, preservative-free12/28/2009  Pfizer COVID-19 vaccine, bivalent, age 12 years and older (30 mcg/0.3 mL)11/4/2022  Pfizer Gray Cap SARS-CoV-29/20/2023  Pfizer Purple Cap SARS-CoV-212/13/2021, 4/20/2021, 3/30/2021  Pneumococcal polysaccharide vaccine, 23-valent, age 2 years and older (PNEUMOVAX  23)6/22/2018  Tdap vaccine, age 7 year and older (BOOSTRIX, ADACEL)3/14/2017

## 2024-09-12 NOTE — ASSESSMENT & PLAN NOTE
No recent hospitalizations.    All medications reviewed and reconciled by me the provider..  No use of controlled substances or opiates.    Family history, social history reviewed, no changes.    Patient does not smoke.    Patient does not drink.    Patient hydrates adequately daily.  Eats a well-balanced healthy diet.     Exercises adequately including walking and doing weightbearing exercises.    Patient denies any difficulty with memory or cognition.     Denies any difficulty with hearing.  Patient does not wear hearing aids.    No fall risk.  No recent falls.  Denies any difficulty walking.    Patient with no history of depression anxiety, denies any loss of interest, no feeling of sadness, no lack of motivation.    Patient is independent in all ADLs and IADLs.  Independent bathing, dressing, walking.  Takes care of own finances, shopping and cooking.     End-of-life decision-making power of  reviewed with patient.     Risk Factors Identified During Visit: None.   Influenza: influenza vaccine was previously given.   Pneumovax 23: Pneumovax 23 vaccine was previously given.   Prevnar 13: Prevnar 13 vaccine was previously given.   Shingles Vaccine: Shingles vaccine was previously given.   Prostate cancer screening: Screening is current.   Colorectal Cancer Screening: screening is current.   Abdominal Aortic Aneurysm screening: screening is current.   HIV screening: screening not indicated.       Preventive measures - Recommend ASAP : Refer to GI for  Colonoscopy (educate patient risks of colon cancer) refer patient to GI specialist. Ophthalmology and retina exam recommend yearly exams refer patient to an Ophthalmologist. BPH - (Benign Prostatic Hypertrophy) refer patient to an Urologist for rectal exam and PSA check.

## 2024-09-12 NOTE — ASSESSMENT & PLAN NOTE
Hypogonadism - monitor Testosterone level - and supplement 300 mg IM - now - inject Testosterone 300 mg IM. Repeat on a monthly basis. Monitor Testosterone level and symptoms ( fatigue, decrease libido, muscle weakness). Educate the patient upon the risks of Stroke, heart attacks and possible venous thromboembolism with possible pulmonary embolism and even death associated with Testosterone treatment . The patient understood the risks associated with Testosterone treatment and whishes to continue the Testosterone supplement and aggress with a plan to control tightly the Blood pressure and cholesterol and take a Baby aspirin 81 mg daily to prevent these complications. He enjoys the benefits of the Testosterone. Also check Prolactin and LH and FSH levels to rule out pituitary adenoma and prolactinoma.

## 2024-10-21 DIAGNOSIS — E08.9 DIABETES MELLITUS DUE TO UNDERLYING CONDITION WITHOUT COMPLICATION, WITHOUT LONG-TERM CURRENT USE OF INSULIN (MULTI): ICD-10-CM

## 2024-10-21 DIAGNOSIS — K13.79 MOUTH SORES: ICD-10-CM

## 2024-10-21 RX ORDER — VALACYCLOVIR HYDROCHLORIDE 1 G/1
1000 TABLET, FILM COATED ORAL EVERY 12 HOURS
Qty: 60 TABLET | Refills: 0 | Status: SHIPPED | OUTPATIENT
Start: 2024-10-21

## 2024-10-21 RX ORDER — METFORMIN HYDROCHLORIDE 500 MG/1
TABLET ORAL
Qty: 90 TABLET | Refills: 0 | Status: SHIPPED | OUTPATIENT
Start: 2024-10-21

## 2024-10-29 ENCOUNTER — TELEMEDICINE (OUTPATIENT)
Dept: PRIMARY CARE | Facility: CLINIC | Age: 49
End: 2024-10-29
Payer: MEDICARE

## 2024-10-29 ENCOUNTER — TELEPHONE (OUTPATIENT)
Dept: PRIMARY CARE | Facility: CLINIC | Age: 49
End: 2024-10-29

## 2024-10-29 DIAGNOSIS — U07.1 ACUTE COVID-19: Primary | ICD-10-CM

## 2024-10-29 DIAGNOSIS — U07.1 COVID-19: Primary | ICD-10-CM

## 2024-10-29 DIAGNOSIS — R06.2 WHEEZING ON AUSCULTATION: ICD-10-CM

## 2024-10-29 PROCEDURE — 99442 PR PHYS/QHP TELEPHONE EVALUATION 11-20 MIN: CPT | Performed by: INTERNAL MEDICINE

## 2024-10-29 RX ORDER — BENZONATATE 100 MG/1
100 CAPSULE ORAL 3 TIMES DAILY PRN
Qty: 21 CAPSULE | Refills: 0 | Status: SHIPPED | OUTPATIENT
Start: 2024-10-29 | End: 2024-11-05

## 2024-10-29 RX ORDER — NIRMATRELVIR AND RITONAVIR 300-100 MG
3 KIT ORAL 2 TIMES DAILY
Qty: 30 TABLET | Refills: 0 | Status: SHIPPED | OUTPATIENT
Start: 2024-10-29 | End: 2024-11-03

## 2024-11-03 ASSESSMENT — ENCOUNTER SYMPTOMS
MUSCULOSKELETAL NEGATIVE: 1
COUGH: 1
PSYCHIATRIC NEGATIVE: 1
GASTROINTESTINAL NEGATIVE: 1
CONSTITUTIONAL NEGATIVE: 1
CARDIOVASCULAR NEGATIVE: 1
NEUROLOGICAL NEGATIVE: 1
ALLERGIC/IMMUNOLOGIC NEGATIVE: 1
HEMATOLOGIC/LYMPHATIC NEGATIVE: 1
EYES NEGATIVE: 1
ENDOCRINE NEGATIVE: 1

## 2024-12-02 DIAGNOSIS — G47.33 OBSTRUCTIVE SLEEP APNEA SYNDROME: ICD-10-CM

## 2024-12-02 DIAGNOSIS — R52 PAIN: ICD-10-CM

## 2024-12-02 RX ORDER — BACLOFEN 5 MG/1
5 TABLET ORAL 3 TIMES DAILY
Qty: 270 TABLET | Refills: 0 | Status: SHIPPED | OUTPATIENT
Start: 2024-12-02

## 2024-12-11 DIAGNOSIS — M62.838 MUSCLE SPASM: ICD-10-CM

## 2024-12-11 RX ORDER — CYCLOBENZAPRINE HCL 10 MG
10 TABLET ORAL NIGHTLY PRN
Qty: 30 TABLET | Refills: 3 | Status: SHIPPED | OUTPATIENT
Start: 2024-12-11

## 2024-12-17 DIAGNOSIS — E78.2 ELEVATED TRIGLYCERIDES WITH HIGH CHOLESTEROL: ICD-10-CM

## 2024-12-17 RX ORDER — GEMFIBROZIL 600 MG/1
600 TABLET, FILM COATED ORAL DAILY
Qty: 30 TABLET | Refills: 0 | Status: SHIPPED | OUTPATIENT
Start: 2024-12-17

## 2024-12-23 DIAGNOSIS — E78.2 ELEVATED TRIGLYCERIDES WITH HIGH CHOLESTEROL: ICD-10-CM

## 2024-12-23 DIAGNOSIS — I10 BENIGN ESSENTIAL HYPERTENSION: ICD-10-CM

## 2024-12-23 DIAGNOSIS — K13.79 MOUTH SORES: ICD-10-CM

## 2024-12-23 DIAGNOSIS — M62.838 MUSCLE SPASM: ICD-10-CM

## 2024-12-23 RX ORDER — HYDROCHLOROTHIAZIDE 12.5 MG/1
12.5 TABLET ORAL EVERY MORNING
Qty: 90 TABLET | Refills: 0 | Status: SHIPPED | OUTPATIENT
Start: 2024-12-23

## 2024-12-23 RX ORDER — CYCLOBENZAPRINE HCL 10 MG
10 TABLET ORAL NIGHTLY PRN
Qty: 30 TABLET | Refills: 3 | Status: SHIPPED | OUTPATIENT
Start: 2024-12-23

## 2024-12-23 RX ORDER — VALACYCLOVIR HYDROCHLORIDE 1 G/1
1000 TABLET, FILM COATED ORAL EVERY 12 HOURS
Qty: 60 TABLET | Refills: 0 | Status: SHIPPED | OUTPATIENT
Start: 2024-12-23

## 2024-12-23 RX ORDER — GEMFIBROZIL 600 MG/1
600 TABLET, FILM COATED ORAL DAILY
Qty: 30 TABLET | Refills: 0 | Status: SHIPPED | OUTPATIENT
Start: 2024-12-23

## 2025-01-30 DIAGNOSIS — E78.2 ELEVATED TRIGLYCERIDES WITH HIGH CHOLESTEROL: ICD-10-CM

## 2025-01-30 DIAGNOSIS — I10 BENIGN ESSENTIAL HYPERTENSION: ICD-10-CM

## 2025-01-30 DIAGNOSIS — K13.79 MOUTH SORES: ICD-10-CM

## 2025-01-30 DIAGNOSIS — E08.9 DIABETES MELLITUS DUE TO UNDERLYING CONDITION WITHOUT COMPLICATION, WITHOUT LONG-TERM CURRENT USE OF INSULIN (MULTI): ICD-10-CM

## 2025-02-04 ENCOUNTER — TELEPHONE (OUTPATIENT)
Dept: PRIMARY CARE | Facility: CLINIC | Age: 50
End: 2025-02-04

## 2025-02-04 RX ORDER — GEMFIBROZIL 600 MG/1
600 TABLET, FILM COATED ORAL DAILY
Qty: 30 TABLET | Refills: 0 | Status: SHIPPED | OUTPATIENT
Start: 2025-02-04

## 2025-02-04 RX ORDER — DOXYCYCLINE 100 MG/1
100 CAPSULE ORAL EVERY 12 HOURS
Qty: 180 CAPSULE | Refills: 0 | Status: SHIPPED | OUTPATIENT
Start: 2025-02-04

## 2025-02-04 RX ORDER — METFORMIN HYDROCHLORIDE 500 MG/1
500 TABLET ORAL DAILY
Qty: 90 TABLET | Refills: 0 | Status: SHIPPED | OUTPATIENT
Start: 2025-02-04

## 2025-02-04 RX ORDER — HYDROCHLOROTHIAZIDE 12.5 MG/1
12.5 TABLET ORAL EVERY MORNING
Qty: 90 TABLET | Refills: 0 | Status: SHIPPED | OUTPATIENT
Start: 2025-02-04

## 2025-02-05 LAB
ALBUMIN SERPL-MCNC: 4.8 G/DL (ref 3.6–5.1)
ALP SERPL-CCNC: 89 U/L (ref 36–130)
ALT SERPL-CCNC: 23 U/L (ref 9–46)
ANION GAP SERPL CALCULATED.4IONS-SCNC: 11 MMOL/L (CALC) (ref 7–17)
AST SERPL-CCNC: 28 U/L (ref 10–40)
BASOPHILS # BLD AUTO: 67 CELLS/UL (ref 0–200)
BASOPHILS NFR BLD AUTO: 0.5 %
BILIRUB SERPL-MCNC: 0.5 MG/DL (ref 0.2–1.2)
BUN SERPL-MCNC: 11 MG/DL (ref 7–25)
CALCIUM SERPL-MCNC: 10.1 MG/DL (ref 8.6–10.3)
CHLORIDE SERPL-SCNC: 100 MMOL/L (ref 98–110)
CHOLEST SERPL-MCNC: 188 MG/DL
CHOLEST/HDLC SERPL: 4.5 (CALC)
CO2 SERPL-SCNC: 27 MMOL/L (ref 20–32)
CREAT SERPL-MCNC: 1.12 MG/DL (ref 0.6–1.29)
EGFRCR SERPLBLD CKD-EPI 2021: 81 ML/MIN/1.73M2
EOSINOPHIL # BLD AUTO: 93 CELLS/UL (ref 15–500)
EOSINOPHIL NFR BLD AUTO: 0.7 %
ERYTHROCYTE [DISTWIDTH] IN BLOOD BY AUTOMATED COUNT: 12.4 % (ref 11–15)
EST. AVERAGE GLUCOSE BLD GHB EST-MCNC: 131 MG/DL
EST. AVERAGE GLUCOSE BLD GHB EST-SCNC: 7.3 MMOL/L
GLUCOSE SERPL-MCNC: 95 MG/DL (ref 65–139)
HBA1C MFR BLD: 6.2 % OF TOTAL HGB
HCT VFR BLD AUTO: 49 % (ref 38.5–50)
HDLC SERPL-MCNC: 42 MG/DL
HGB BLD-MCNC: 16.4 G/DL (ref 13.2–17.1)
LDLC SERPL CALC-MCNC: 115 MG/DL (CALC)
LYMPHOCYTES # BLD AUTO: 3525 CELLS/UL (ref 850–3900)
LYMPHOCYTES NFR BLD AUTO: 26.5 %
MCH RBC QN AUTO: 32 PG (ref 27–33)
MCHC RBC AUTO-ENTMCNC: 33.5 G/DL (ref 32–36)
MCV RBC AUTO: 95.7 FL (ref 80–100)
MONOCYTES # BLD AUTO: 918 CELLS/UL (ref 200–950)
MONOCYTES NFR BLD AUTO: 6.9 %
NEUTROPHILS # BLD AUTO: 8698 CELLS/UL (ref 1500–7800)
NEUTROPHILS NFR BLD AUTO: 65.4 %
NONHDLC SERPL-MCNC: 146 MG/DL (CALC)
PLATELET # BLD AUTO: 388 THOUSAND/UL (ref 140–400)
PMV BLD REES-ECKER: 9.5 FL (ref 7.5–12.5)
POTASSIUM SERPL-SCNC: 4.1 MMOL/L (ref 3.5–5.3)
PROT SERPL-MCNC: 6.9 G/DL (ref 6.1–8.1)
PSA SERPL-MCNC: 0.34 NG/ML
RBC # BLD AUTO: 5.12 MILLION/UL (ref 4.2–5.8)
SODIUM SERPL-SCNC: 138 MMOL/L (ref 135–146)
TRIGL SERPL-MCNC: 192 MG/DL
TSH SERPL-ACNC: 0.66 MIU/L (ref 0.4–4.5)
WBC # BLD AUTO: 13.3 THOUSAND/UL (ref 3.8–10.8)

## 2025-02-06 ENCOUNTER — APPOINTMENT (OUTPATIENT)
Dept: PRIMARY CARE | Facility: CLINIC | Age: 50
End: 2025-02-06
Payer: MEDICARE

## 2025-02-10 DIAGNOSIS — K13.79 MOUTH SORES: ICD-10-CM

## 2025-02-10 RX ORDER — VALACYCLOVIR HYDROCHLORIDE 1 G/1
1000 TABLET, FILM COATED ORAL EVERY 12 HOURS
Qty: 60 TABLET | Refills: 0 | Status: SHIPPED | OUTPATIENT
Start: 2025-02-10

## 2025-03-07 ENCOUNTER — APPOINTMENT (OUTPATIENT)
Dept: PRIMARY CARE | Facility: CLINIC | Age: 50
End: 2025-03-07
Payer: MEDICARE

## 2025-03-07 ENCOUNTER — TELEPHONE (OUTPATIENT)
Dept: PRIMARY CARE | Facility: CLINIC | Age: 50
End: 2025-03-07

## 2025-03-07 VITALS
OXYGEN SATURATION: 98 % | SYSTOLIC BLOOD PRESSURE: 132 MMHG | HEART RATE: 90 BPM | RESPIRATION RATE: 21 BRPM | BODY MASS INDEX: 30.36 KG/M2 | HEIGHT: 69 IN | WEIGHT: 205 LBS | DIASTOLIC BLOOD PRESSURE: 80 MMHG

## 2025-03-07 DIAGNOSIS — M50.30 DEGENERATIVE DISC DISEASE, CERVICAL: ICD-10-CM

## 2025-03-07 DIAGNOSIS — R52 PAIN: ICD-10-CM

## 2025-03-07 DIAGNOSIS — E78.00 HYPERCHOLESTEREMIA: ICD-10-CM

## 2025-03-07 DIAGNOSIS — R53.83 MALAISE AND FATIGUE: ICD-10-CM

## 2025-03-07 DIAGNOSIS — E88.9 METABOLIC DISORDER: ICD-10-CM

## 2025-03-07 DIAGNOSIS — E08.9 DIABETES MELLITUS DUE TO UNDERLYING CONDITION WITHOUT COMPLICATION, WITHOUT LONG-TERM CURRENT USE OF INSULIN (MULTI): ICD-10-CM

## 2025-03-07 DIAGNOSIS — R53.81 MALAISE AND FATIGUE: ICD-10-CM

## 2025-03-07 DIAGNOSIS — E78.2 ELEVATED TRIGLYCERIDES WITH HIGH CHOLESTEROL: ICD-10-CM

## 2025-03-07 DIAGNOSIS — I10 BENIGN ESSENTIAL HYPERTENSION: ICD-10-CM

## 2025-03-07 DIAGNOSIS — Z00.00 MEDICARE ANNUAL WELLNESS VISIT, SUBSEQUENT: Primary | ICD-10-CM

## 2025-03-07 DIAGNOSIS — E29.1 HYPOGONADISM MALE: ICD-10-CM

## 2025-03-07 DIAGNOSIS — I87.2 CHRONIC VENOUS INSUFFICIENCY: ICD-10-CM

## 2025-03-07 DIAGNOSIS — Z00.00 ROUTINE GENERAL MEDICAL EXAMINATION AT HEALTH CARE FACILITY: ICD-10-CM

## 2025-03-07 DIAGNOSIS — F31.81 BIPOLAR II DISORDER (MULTI): ICD-10-CM

## 2025-03-07 DIAGNOSIS — D84.9 IMMUNOSUPPRESSION: ICD-10-CM

## 2025-03-07 PROBLEM — Z86.19 HISTORY OF VIRAL INFECTION: Status: ACTIVE | Noted: 2025-03-07

## 2025-03-07 PROBLEM — F19.931: Status: ACTIVE | Noted: 2025-03-07

## 2025-03-07 PROBLEM — K59.00 CONSTIPATION: Status: ACTIVE | Noted: 2025-03-07

## 2025-03-07 PROBLEM — K56.41 FECAL IMPACTION OF RECTUM (MULTI): Status: ACTIVE | Noted: 2025-03-07

## 2025-03-07 PROBLEM — F19.931: Status: RESOLVED | Noted: 2025-03-07 | Resolved: 2025-03-07

## 2025-03-07 PROBLEM — N52.9 DIFFICULTY ATTAINING ERECTION: Status: ACTIVE | Noted: 2025-03-07

## 2025-03-07 PROBLEM — Z86.59 HISTORY OF DEPRESSION: Status: ACTIVE | Noted: 2025-03-07

## 2025-03-07 PROBLEM — M17.10 ARTHRITIS OF KNEE: Status: ACTIVE | Noted: 2025-03-07

## 2025-03-07 PROBLEM — R10.9 LEFT FLANK PAIN: Status: ACTIVE | Noted: 2025-03-07

## 2025-03-07 PROBLEM — E23.0 HYPOPITUITARISM (MULTI): Status: ACTIVE | Noted: 2025-03-07

## 2025-03-07 PROBLEM — G25.81 RESTLESS LEGS SYNDROME: Status: ACTIVE | Noted: 2025-03-07

## 2025-03-07 RX ORDER — GEMFIBROZIL 600 MG/1
600 TABLET, FILM COATED ORAL DAILY
Qty: 30 TABLET | Refills: 0 | Status: SHIPPED | OUTPATIENT
Start: 2025-03-07

## 2025-03-07 RX ORDER — ACETAMINOPHEN AND CODEINE PHOSPHATE 300; 30 MG/1; MG/1
1 TABLET ORAL EVERY 6 HOURS PRN
Qty: 20 TABLET | Refills: 0 | Status: SHIPPED | OUTPATIENT
Start: 2025-03-07 | End: 2025-03-12

## 2025-03-07 RX ORDER — HYDROCHLOROTHIAZIDE 12.5 MG/1
12.5 TABLET ORAL EVERY MORNING
Qty: 90 TABLET | Refills: 0 | Status: SHIPPED | OUTPATIENT
Start: 2025-03-07

## 2025-03-07 RX ORDER — METFORMIN HYDROCHLORIDE 500 MG/1
500 TABLET ORAL DAILY
Qty: 90 TABLET | Refills: 0 | Status: SHIPPED | OUTPATIENT
Start: 2025-03-07

## 2025-03-07 RX ORDER — BACLOFEN 5 MG/1
5 TABLET ORAL 3 TIMES DAILY
Qty: 270 TABLET | Refills: 0 | Status: SHIPPED | OUTPATIENT
Start: 2025-03-07

## 2025-03-07 ASSESSMENT — ACTIVITIES OF DAILY LIVING (ADL)
BATHING: INDEPENDENT
DRESSING: INDEPENDENT
GROCERY_SHOPPING: INDEPENDENT
DOING_HOUSEWORK: INDEPENDENT
MANAGING_FINANCES: INDEPENDENT

## 2025-03-07 ASSESSMENT — ENCOUNTER SYMPTOMS
CARDIOVASCULAR NEGATIVE: 1
LOSS OF SENSATION IN FEET: 0
MUSCULOSKELETAL NEGATIVE: 1
PSYCHIATRIC NEGATIVE: 1
HEMATOLOGIC/LYMPHATIC NEGATIVE: 1
CONSTITUTIONAL NEGATIVE: 1
GASTROINTESTINAL NEGATIVE: 1
DEPRESSION: 1
RESPIRATORY NEGATIVE: 1
NEUROLOGICAL NEGATIVE: 1
EYES NEGATIVE: 1
ENDOCRINE NEGATIVE: 1
ALLERGIC/IMMUNOLOGIC NEGATIVE: 1
OCCASIONAL FEELINGS OF UNSTEADINESS: 0

## 2025-03-07 ASSESSMENT — PATIENT HEALTH QUESTIONNAIRE - PHQ9
1. LITTLE INTEREST OR PLEASURE IN DOING THINGS: SEVERAL DAYS
10. IF YOU CHECKED OFF ANY PROBLEMS, HOW DIFFICULT HAVE THESE PROBLEMS MADE IT FOR YOU TO DO YOUR WORK, TAKE CARE OF THINGS AT HOME, OR GET ALONG WITH OTHER PEOPLE: SOMEWHAT DIFFICULT
SUM OF ALL RESPONSES TO PHQ9 QUESTIONS 1 AND 2: 2
2. FEELING DOWN, DEPRESSED OR HOPELESS: SEVERAL DAYS

## 2025-03-07 NOTE — ASSESSMENT & PLAN NOTE
HTN - hypertension well/controlled .Target BP < 130/80 achieved. Educate low salt diet and exercise with weight loss. Educate home self monitoring and diary keeping. Educate risks of elevate blood pressure and benefits of prompt treatment. Refill hydrochlorothiazide        Orders:    hydroCHLOROthiazide (Microzide) 12.5 mg tablet; Take 1 tablet (12.5 mg) by mouth once daily in the morning.

## 2025-03-07 NOTE — ASSESSMENT & PLAN NOTE
DDD - Degenerative disc disease of the )/Cervical (C)  spine. Educate exercises and referred patient to Physical Therapy (PT). Ordered X-Ray's of the /C spine. Consider MRI. Radiculopathy in the distribution of C4-C5-C6 nerve roots, needs NSAIDS (Naprosin 500mg BID vs. Arthrotec 75mg BID or Prednisone taper (Medrol dose pack), Flexeril 10 mg qHS, heat application, and pain control with Tylenol vs. Vicodin 5/325 mg TID.      Orders:    acetaminophen-codeine (Tylenol w/ Codeine #3) 300-30 mg tablet; Take 1 tablet by mouth every 6 hours if needed for severe pain (7 - 10) for up to 5 days.

## 2025-03-07 NOTE — PROGRESS NOTES
"Subjective   Reason for Visit: Gael Lara is an 49 y.o. male here for a Medicare Wellness visit.          Reviewed all medications by prescribing practitioner or clinical pharmacist (such as prescriptions, OTCs, herbal therapies and supplements) and documented in the medical record.    HPI    Patient Care Team:  Jarek Mandujano MD as PCP - General (Internal Medicine)  Jarek Mandujano MD as PCP - [employer] PCP     Review of Systems   Constitutional: Negative.    HENT: Negative.     Eyes: Negative.    Respiratory: Negative.     Cardiovascular: Negative.    Gastrointestinal: Negative.    Endocrine: Negative.    Musculoskeletal: Negative.    Skin: Negative.    Allergic/Immunologic: Negative.    Neurological: Negative.    Hematological: Negative.    Psychiatric/Behavioral: Negative.     All other systems reviewed and are negative.      Objective   Vitals:  /80   Pulse 90   Resp 21   Ht 1.753 m (5' 9\")   Wt 93 kg (205 lb)   SpO2 98%   BMI 30.27 kg/m²       Physical Exam  Vitals and nursing note reviewed.   Constitutional:       Appearance: Normal appearance.   HENT:      Head: Normocephalic and atraumatic.      Right Ear: Tympanic membrane, ear canal and external ear normal.      Left Ear: Tympanic membrane, ear canal and external ear normal. There is no impacted cerumen.      Nose: Nose normal.      Mouth/Throat:      Mouth: Mucous membranes are moist.      Pharynx: Oropharynx is clear.   Eyes:      Extraocular Movements: Extraocular movements intact.      Conjunctiva/sclera: Conjunctivae normal.      Pupils: Pupils are equal, round, and reactive to light.   Cardiovascular:      Rate and Rhythm: Normal rate and regular rhythm.      Pulses: Normal pulses.      Heart sounds: Normal heart sounds. No murmur heard.  Pulmonary:      Effort: Pulmonary effort is normal. No respiratory distress.      Breath sounds: Normal breath sounds. No stridor. No wheezing, rhonchi or rales.   Chest:      " Chest wall: No tenderness.   Abdominal:      General: Abdomen is flat. Bowel sounds are normal. There is no distension.      Palpations: Abdomen is soft. There is no mass.      Tenderness: There is no abdominal tenderness. There is no right CVA tenderness, left CVA tenderness, guarding or rebound.      Hernia: No hernia is present.   Musculoskeletal:         General: Normal range of motion.      Cervical back: Normal range of motion and neck supple.   Skin:     General: Skin is warm.      Capillary Refill: Capillary refill takes less than 2 seconds.   Neurological:      General: No focal deficit present.      Mental Status: He is alert.      Cranial Nerves: No cranial nerve deficit.      Sensory: No sensory deficit.      Motor: No weakness.      Coordination: Coordination normal.      Gait: Gait normal.      Deep Tendon Reflexes: Reflexes normal.   Psychiatric:         Mood and Affect: Mood normal.         Behavior: Behavior normal. Behavior is cooperative.         Thought Content: Thought content normal.         Judgment: Judgment normal.         Assessment & Plan  Elevated triglycerides with high cholesterol    Orders:    gemfibrozil (Lopid) 600 mg tablet; Take 1 tablet (600 mg) by mouth once daily.    Benign essential hypertension  HTN - hypertension well/controlled .Target BP < 130/80 achieved. Educate low salt diet and exercise with weight loss. Educate home self monitoring and diary keeping. Educate risks of elevate blood pressure and benefits of prompt treatment. Refill hydrochlorothiazide        Orders:    hydroCHLOROthiazide (Microzide) 12.5 mg tablet; Take 1 tablet (12.5 mg) by mouth once daily in the morning.    Diabetes mellitus due to underlying condition without complication, without long-term current use of insulin (Multi)    Orders:    metFORMIN (Glucophage) 500 mg tablet; Take 1 tablet (500 mg) by mouth once daily.    Pain    Orders:    baclofen (Lioresal) 5 mg tablet; Take 1 tablet (5 mg) by mouth  3 times a day.    Routine general medical examination at health care facility    Orders:    1 Year Follow Up In Primary Care - Wellness Exam; Future    Chronic venous insufficiency  Edema - and leg swelling dur to venous insufficiency - responding to low dose Furosemide and recommend: leg elevation and compression stockings -          Hypercholesteremia  Hypercholesterolemia - Monitor lipid profile and educate patient upon risks of high cholesterol and targets. Educate diet and change in lifestyle and increase in exercises - Refill:   and educate compliance with medication and diet.           Metabolic disorder  Educate extensively low carbohydrate diet AND LOW FAT DIET AND increase in exercise activity  and weight loss program and monitor HbA1C and glucose levels and consider Metformin 500 mg BID with meals            Hypogonadism male  Hypogonadism - monitor Testosterone level - and supplement 300 mg IM - now - inject Testosterone 300 mg IM. Repeat on a monthly basis. Monitor Testosterone level and symptoms ( fatigue, decrease libido, muscle weakness). Educate the patient upon the risks of Stroke, heart attacks and possible venous thromboembolism with possible pulmonary embolism and even death associated with Testosterone treatment . The patient understood the risks associated with Testosterone treatment and whishes to continue the Testosterone supplement and aggress with a plan to control tightly the Blood pressure and cholesterol and take a Baby aspirin 81 mg daily to prevent these complications. He enjoys the benefits of the Testosterone. Also check Prolactin and LH and FSH levels to rule out pituitary adenoma and prolactinoma.          Medicare annual wellness visit, subsequent  No recent hospitalizations.     All medications reviewed and reconciled by me the provider..  No use of controlled substances or opiates.     Family history, social history reviewed, no changes.     Patient does not smoke.     Patient  does not drink.     Patient hydrates adequately daily.  Eats a well-balanced healthy diet.      Exercises adequately including walking and doing weightbearing exercises.     Patient denies any difficulty with memory or cognition.      Denies any difficulty with hearing.  Patient does not wear hearing aids.     No fall risk.  No recent falls.  Denies any difficulty walking.     Patient with no history of depression anxiety, denies any loss of interest, no feeling of sadness, no lack of motivation.     Patient is independent in all ADLs and IADLs.  Independent bathing, dressing, walking.  Takes care of own finances, shopping and cooking.      End-of-life decision-making power of  reviewed with patient.      Risk Factors Identified During Visit: None.   Influenza: influenza vaccine was previously given.   Pneumovax 23: Pneumovax 23 vaccine was previously given.   Prevnar 13: Prevnar 13 vaccine was previously given.   Shingles Vaccine: Shingles vaccine was previously given.   Prostate cancer screening: Screening is current.   Colorectal Cancer Screening: screening is current.   Abdominal Aortic Aneurysm screening: screening is current.   HIV screening: screening not indicated.        Preventive measures - Recommend ASAP : Refer to GI for  Colonoscopy (educate patient risks of colon cancer) refer patient to GI specialist. Ophthalmology and retina exam recommend yearly exams refer patient to an Ophthalmologist. BPH - (Benign Prostatic Hypertrophy) refer patient to an Urologist for rectal exam and PSA check.             Immunosuppression  Needs vaccinations and Prophylactic treatment          Degenerative disc disease, cervical  DDD - Degenerative disc disease of the )/Cervical (C)  spine. Educate exercises and referred patient to Physical Therapy (PT). Ordered X-Ray's of the /C spine. Consider MRI. Radiculopathy in the distribution of C4-C5-C6 nerve roots, needs NSAIDS (Naprosin 500mg BID vs. Arthrotec 75mg BID or  Prednisone taper (Medrol dose pack), Flexeril 10 mg qHS, heat application, and pain control with Tylenol vs. Vicodin 5/325 mg TID.      Orders:    acetaminophen-codeine (Tylenol w/ Codeine #3) 300-30 mg tablet; Take 1 tablet by mouth every 6 hours if needed for severe pain (7 - 10) for up to 5 days.    Malaise and fatigue  Fatigue - check CMP(metabolic panel and elctrolytes) , CBC(complete blood cell count), TSH(thyroid function). Insomnia may play a role and sleep studies(rule out sleep apnea) are recommended . Educate sleep hygiene. Consider anxiety disorder vs. depression. Consider Stress test, and 2DECHO.           Bipolar II disorder (Multi)                Cardiac Risk Assessment  15 - 20 minutes were spent discussing Cardiovascular risk and, if needed, lifestyle modifications were recommended, including nutritional choices, exercise, and elimination of habits contributing to risk.   Aspirin use/disuse was discussed following the guidelines below:  low dose ASA ( mg) should be considered:    If prior Heart Attack/Stroke/Peripheral vascular disease:  Generally recommend daily low dose aspirin unless extremely high bleeding risk (e.g., gastrointestinal).    If no prior Heart Attack/Stroke/Peripheral vascular disease:              Age over 70: Do not use Aspirin for prevention    Age less than 70 and 10-year cardiovascular disease risk is >20%: use low dose Aspirin for prevention.                    Benign essential hypertension I10        HTN - hypertension well/controlled .Target BP < 130/80 achieved. Educate low salt diet and exercise with weight loss. Educate home self monitoring and diary keeping. Educate risks of elevate blood pressure and benefits of prompt treatment. Refill hydrochlorothiazide            Relevant Medications     hydroCHLOROthiazide (Microzide) 12.5 mg tablet     Chronic venous insufficiency I87.2       Edema - and leg swelling dur to venous insufficiency - responding to low dose  Furosemide and recommend: leg elevation and compression stockings -            Decreased thyroid stimulating hormone (TSH) level R79.89       Hypothyroidism - Symptoms well/not controlled (weight gain, fatigue, constipation, cold intolerance). Check TSH continue/change dose of Synthroid/Levothyroxine  of  mcg/qD.            Hypogonadism male E29.1       Hypogonadism - monitor Testosterone level - and supplement 300 mg IM - now - inject Testosterone 300 mg IM. Repeat on a monthly basis. Monitor Testosterone level and symptoms ( fatigue, decrease libido, muscle weakness). Educate the patient upon the risks of Stroke, heart attacks and possible venous thromboembolism with possible pulmonary embolism and even death associated with Testosterone treatment . The patient understood the risks associated with Testosterone treatment and whishes to continue the Testosterone supplement and aggress with a plan to control tightly the Blood pressure and cholesterol and take a Baby aspirin 81 mg daily to prevent these complications. He enjoys the benefits of the Testosterone. Also check Prolactin and LH and FSH levels to rule out pituitary adenoma and prolactinoma.            Idiopathic peripheral neuropathy G60.9       Neuropathy/ - positive numbness, tingling, discomfort (needles pricking, cold feeling) in distal lower extremities(toes, feet, legs), secondary to DM/Radiculopathy/idiopathic. Recommend: Gabapentin(Neurontin) 300 daily(at bed time) upward taper up to 2 gm/day or Lyrica 75 mg daily if failure of treatment. Also recommend: treatment of Diabetes(control levels of blood sugars), lumbar/ cervical disc disease (Physical Therapy), and check electrolytes and Thyroid function. Also address regenrative measures: B12 intrmusculary (Monthly) and Folic acid supplementation. Recommend EMG. Start Amitriptyline 25 mg daily / . Start - Tonic water - and Tramadol 50 mg TID.            Insomnia G47.00     Metabolic disorder E88.9        Educate extensively low carbohydrate diet AND LOW FAT DIET AND increase in exercise activity  and weight loss program and monitor HbA1C and glucose levels and consider Metformin 500 mg BID with meals              Pain R52     Relevant Medications     baclofen (Lioresal) 5 mg tablet     Hypercholesteremia E78.00       Hypercholesterolemia - Monitor lipid profile and educate patient upon risks of high cholesterol and targets. Educate diet and change in lifestyle and increase in exercises - Refill:   and educate compliance with medication and diet.             Relevant Orders     Lipid Panel     Medicare annual wellness visit, subsequent - Primary Z00.00       No recent hospitalizations.     All medications reviewed and reconciled by me the provider..  No use of controlled substances or opiates.     Family history, social history reviewed, no changes.     Patient does not smoke.     Patient does not drink.     Patient hydrates adequately daily.  Eats a well-balanced healthy diet.      Exercises adequately including walking and doing weightbearing exercises.     Patient denies any difficulty with memory or cognition.      Denies any difficulty with hearing.  Patient does not wear hearing aids.     No fall risk.  No recent falls.  Denies any difficulty walking.     Patient with no history of depression anxiety, denies any loss of interest, no feeling of sadness, no lack of motivation.     Patient is independent in all ADLs and IADLs.  Independent bathing, dressing, walking.  Takes care of own finances, shopping and cooking.      End-of-life decision-making power of  reviewed with patient.      Risk Factors Identified During Visit: None.   Influenza: influenza vaccine was previously given.   Pneumovax 23: Pneumovax 23 vaccine was previously given.   Prevnar 13: Prevnar 13 vaccine was previously given.   Shingles Vaccine: Shingles vaccine was previously given.   Prostate cancer screening: Screening is current.    Colorectal Cancer Screening: screening is current.   Abdominal Aortic Aneurysm screening: screening is current.   HIV screening: screening not indicated.        Preventive measures - Recommend ASAP : Refer to GI for  Colonoscopy (educate patient risks of colon cancer) refer patient to GI specialist. Ophthalmology and retina exam recommend yearly exams refer patient to an Ophthalmologist. BPH - (Benign Prostatic Hypertrophy) refer patient to an Urologist for rectal exam and PSA check.            Relevant Orders     Prostate Specific Antigen      Other Visit Diagnoses           Codes     Colon cancer screening     Z12.11     Relevant Orders     Colonoscopy Screening; Average Risk Patient     Other fatigue     R53.83     Relevant Orders     CBC and Auto Differential     Comprehensive Metabolic Panel     TSH with reflex to Free T4 if abnormal     Hyperglycemia     R73.9     Relevant Orders     Hemoglobin A1C                      Benign essential hypertension - Primary I10         HTN - hypertension well/controlled .Target BP < 130/80 achieved. Educate low salt diet and exercise with weight loss. Educate home self monitoring and diary keeping. Educate risks of elevate blood pressure and benefits of prompt treatment. Refill hydrochlorothiazide            Bipolar II disorder (Multi) F31.81     Chronic venous insufficiency I87.2       Edema - and leg swelling dur to venous insufficiency - responding to low dose Furosemide and recommend: leg elevation and compression stockings -            Degenerative disc disease, cervical M50.30       DDD - Degenerative disc disease of the )/Cervical (C)  spine. Educate exercises and referred patient to Physical Therapy (PT). Ordered X-Ray's of the /C spine. Consider MRI. Radiculopathy in the distribution of C4-C5-C6 nerve roots, needs NSAIDS (Naprosin 500mg BID vs. Arthrotec 75mg BID or Prednisone taper (Medrol dose pack), Flexeril 10 mg qHS, heat application, and pain control with  Tylenol vs. Vicodin 5/325 mg TID.             Malaise and fatigue R53.81, R53.83       Fatigue - check CMP(metabolic panel and elctrolytes) , CBC(complete blood cell count), TSH(thyroid function). Insomnia may play a role and sleep studies(rule out sleep apnea) are recommended . Educate sleep hygiene. Consider anxiety disorder vs. depression. Consider Stress test, and 2DECHO.             Metabolic disorder E88.9       Educate extensively low carbohydrate diet AND LOW FAT DIET AND increase in exercise activity and weight loss program and monitor HbA1C and glucose levels and consider Metformin 500 mg BID with meals and Ozempic                            Benign essential hypertension I10           HTN - hypertension well/controlled .Target BP < 130/80  achieved. Educate low salt diet and exercise with weight loss. Educate home self monitoring and diary keeping. Educate risks of elevate blood pressure and benefits of prompt treatment.  Refill hydrochlorothiazide            Relevant Medications       hydroCHLOROthiazide (Microzide) 12.5 mg tablet       Other Relevant Orders       Comprehensive Metabolic Panel       Mouth sores K13.79       Relevant Medications       valACYclovir (Valtrex) 1 gram tablet       doxycycline (Monodox) 100 mg capsule       Chronic venous insufficiency - Primary I87.2         Edema - and leg swelling dur to venous insufficiency - responding to low dose Furosemide and recommend: leg elevation and compression stockings -            Hypogonadism male E29.1         Hypogonadism  - monitor  Testosterone level  - and supplement 300 mg IM  - now  - inject Testosterone 300 mg IM. Repeat on a monthly basis. Monitor Testosterone level and symptoms ( fatigue, decrease libido, muscle weakness). Educate the patient upon the risks of Stroke, heart attacks and possible venous thromboembolism with possible pulmonary embolism and even death associated with Testosterone treatment .  The patient understood the  risks associated with Testosterone treatment and whishes to continue the Testosterone supplement and aggress with a plan to control tightly the Blood pressure and cholesterol and take a Baby aspirin 81 mg daily to prevent these complications. He enjoys the benefits of the Testosterone. Also check Prolactin and LH and FSH levels to rule out pituitary adenoma and prolactinoma.            Idiopathic peripheral neuropathy G60.9         Neuropathy/ - positive numbness, tingling, discomfort (needles pricking, cold feeling) in distal lower extremities(toes, feet, legs), secondary to DM/Radiculopathy/idiopathic. Recommend: Gabapentin(Neurontin) 300 daily(at bed time) upward taper up to  2 gm/day or Lyrica 75 mg daily if failure of treatment. Also recommend: treatment of Diabetes(control levels of blood sugars), lumbar/ cervical  disc disease (Physical Therapy), and check electrolytes and Thyroid function. Also address regenrative measures: B12 intrmusculary (Monthly) and Folic acid supplementation. Recommend  EMG. Start Amitriptyline 25 mg daily / . Start - Tonic water - and Tramadol 50 mg TID.             Immunosuppression (Multi) D84.9         Needs vaccinations and Prophylactic treatment            Insomnia G47.00       Metabolic disorder E88.9         Educate extensively low carbohydrate diet AND LOW FAT DIET AND increase in exercise activity and weight loss program and monitor HbA1C and glucose levels and consider Metformin 500 mg BID with meals            Obstructive sleep apnea syndrome G47.33        Other Visit Diagnoses           Codes     Diabetes mellitus due to underlying condition without complication, without long-term current use of insulin (Multi)     E08.9     Relevant Medications     metFORMIN (Glucophage) 500 mg tablet     Other Relevant Orders     Lipid Panel     Hemoglobin A1C     Other fatigue     R53.83     Relevant Orders     CBC and Auto Differential     TSH with reflex to Free T4 if abnormal                                      Abnormal kidney function N28.9            CRI - Chronic Renal Insuficiency. Secondary to DM/HTN/Atherosclerosis. Follow BUN/Cr. and lytes.   RENOPROTECTION with ACEI/ARB (). Monitor microalbuminuria. Avoid hypotension and renal hypoperfusion. The patient was instructed to avoid NSAIDS and educate compliance with medications to control HTN(hypertension) and cholesterol and diabetes.                                         Benign essential hypertension I10          HTN - hypertension well/controlled .Target BP < 130/80  achieved. Educate low salt diet and exercise with weight loss. Educate home self monitoring and diary keeping. Educate risks of elevate blood pressure and benefits of prompt treatment.  Refill hydrochlorothiazide              Mouth sores K13.79        Chronic venous insufficiency I87.2          Edema - and leg swelling dur to venous insufficiency - responding to low dose Furosemide and recommend: leg elevation and compression stockings -              Immunosuppression (CMS/HCC) D84.9          Needs vaccinations and Prophylactic treatment              Infectious mononucleosis B27.90          Chronic fatigue syndrome and consider Prednisone taper PRN and antiviral              Malaise and fatigue R53.81, R53.83          Fatigue - check CMP(metabolic panel and elctrolytes) , CBC(complete blood cell count), TSH(thyroid function). Insomnia may play a role and sleep studies(rule out sleep apnea) are recommended . Educate sleep hygiene. Consider anxiety disorder vs. depression. Consider Stress test, and 2DECHO.               Metabolic disorder - Primary E88.9          Educate extensively low carbohydrate diet AND LOW FAT DIET AND increase in exercise activity  and weight loss program and monitor HbA1C and glucose levels and consider Metformin 500 mg BID with meals                Obstructive sleep apnea syndrome G47.33          Sleep apnea/nocturnal hypoxia - Not/ Witnessed - The  patient is complaining of day-time sleepiness(falling asleep easily/  narcolepsy) while driving or sitting still. Also patient complains of major tiredness/ fatigue, multiple episodes of night time awakenings(unexpalined). Also patient is at high risk for sleep apnea: overweight, small throat opening and enlarged (kissing) tonsils, sedentary lifestyle, sleeping aides. Recommend: Sleep studies: Nocturnal oxymetry, sleep lab. Consider CPAP vs. Oxygen per Nasal Canula at night at 2L/min. for nocturnal hypoxia                            Immunizations/Injections       very important  Immunizations from outside sources need reconciliation.       Flu vaccine, quadrivalent, no egg protein, age 6 month or greater (FLUCELVAX)9/20/2023, 11/4/2022  Influenza, Unspecified1/4/2011  Influenza, injectable, quadrivalent9/26/2018  Novel influenza-H1N1-09, preservative-free12/28/2009  Pfizer COVID-19 vaccine, bivalent, age 12 years and older (30 mcg/0.3 mL)11/4/2022  Pfizer Gray Cap SARS-CoV-29/20/2023  Pfizer Purple Cap SARS-CoV-212/13/2021, 4/20/2021, 3/30/2021  Pneumococcal polysaccharide vaccine, 23-valent, age 2 years and older (PNEUMOVAX 23)6/22/2018  Tdap vaccine, age 7 year and older (BOOSTRIX, ADACEL)3/14/2017                   Immunizations/Injections       very important  Immunizations from outside sources need reconciliation.       Flu vaccine, quadrivalent, no egg protein, age 6 month or greater (FLUCELVAX)9/20/2023, 11/4/2022  Influenza, Unspecified1/4/2011  Influenza, injectable, quadrivalent9/26/2018  Novel influenza-H1N1-09, preservative-free12/28/2009  Pfizer COVID-19 vaccine, bivalent, age 12 years and older (30 mcg/0.3 mL)11/4/2022  Pfizer Gray Cap SARS-CoV-29/20/2023  Pfizer Purple Cap SARS-CoV-212/13/2021, 4/20/2021, 3/30/2021  Pneumococcal polysaccharide vaccine, 23-valent, age 2 years and older (PNEUMOVAX 23)6/22/2018  Tdap vaccine, age 7 year and older (BOOSTRIX, ADACEL)3/14/2017                   Immunizations/Injections       very important  Immunizations from outside sources need reconciliation.       Flu vaccine, quadrivalent, no egg protein, age 6 month or greater (FLUCELVAX)9/20/2023, 11/4/2022  Influenza, Unspecified1/4/2011  Influenza, injectable, quadrivalent9/26/2018  Novel influenza-H1N1-09, preservative-free12/28/2009  Pfizer COVID-19 vaccine, bivalent, age 12 years and older (30 mcg/0.3 mL)11/4/2022  Pfizer Gray Cap SARS-CoV-29/20/2023  Pfizer Purple Cap SARS-CoV-212/13/2021, 4/20/2021, 3/30/2021  Pneumococcal polysaccharide vaccine, 23-valent, age 2 years and older (PNEUMOVAX 23)6/22/2018  Tdap vaccine, age 7 year and older (BOOSTRIX, ADACEL)3/14/2017

## 2025-03-07 NOTE — ASSESSMENT & PLAN NOTE
Needs vaccinations and Prophylactic treatment           Positive for abdominal pain (intermittent, resolved). Negative for abdominal distention, blood in stool, constipation, diarrhea and nausea. Musculoskeletal: Positive for arthralgias, back pain and myalgias. Negative for gait problem and joint swelling. Skin: Negative for color change and rash. Neurological: Negative for dizziness, weakness, light-headedness, numbness and headaches. Psychiatric/Behavioral: Negative for dysphoric mood and sleep disturbance. The patient is not nervous/anxious. Past Medical History:   Diagnosis Date    Abnormal EKG 3/23/2018    Angina pectoris syndrome ([facility] [address] 03/23/2018    Diverticulosis of colon     DMII (diabetes mellitus, type 2) ([facility] [address] 2000    Dr Ellen Powers hypertension 1/15/2016    Family history of coronary artery disease 1/15/2016    Fatty infiltration of liver 2020    Gastric polyp 2019    Dr Linda Johnson, 5 mm    Generalized anxiety disorder     H/O: hysterectomy 2012    History of tobacco abuse 1/15/2016    Obesity (BMI 30-39. 9)     Other specified acquired hypothyroidism 2000    Precordial pain 03/23/2018    (? Prinzmetal?) Dr Urban Radford S/P colonoscopy with polypectomy 2013, 2018, 2019    Dr Trung Rizvi, Dr Linda Johnson, tubulovillous adenoma, hyperplastic polyp    S/P vaginal hysterectomy 2012    benign    Sleep apnea, obstructive 2007    was on CPAP, not using it at present    Vertigo 2018     Past Surgical History:   Procedure Laterality Date    CHOLECYSTECTOMY, [place] CATH LAB PROCEDURE      ENDOMETRIAL ABLATION  2002    metrorrhagia    HYSTERECTOMY  9/7/12    fibroid, cervicitis, ovaries intact    [place] COLON CA SCRN NOT  [address] [facility] N/A 5/15/2018    COLONOSCOPY performed by Melissa Rollins MD at [facility] N/A 4/2/2019    EGD ESOPHAGOGASTRODUODENOSCOPY performed by Melissa Rollins MD at [facility] dogs, sawing, machine embroidery, baking     Family History   Problem Relation Age of Onset    Arrhythmia Mother     Hypertension Mother     Dementia Mother     COPD Father         mesothelioma    Alcohol Abuse Father     Diabetes Paternal Grandmother     Diabetes Paternal Grandfather     Colon Cancer Paternal Grandfather     Schizophrenia Brother     Colon Cancer Paternal Uncle      Allergies   Allergen Reactions    Seasonal Itching     Current Outpatient Medications   Medication Sig Dispense Refill    celecoxib (CELEBREX) 200 MG capsule Take 1 capsule by mouth daily For low back/hip pain.  90 capsule 0    methocarbamol (ROBAXIN) 500 MG tablet Take 0.5 tablets by mouth nightly as needed (back/hip spasm) 10 tablet 0    Blood Glucose Monitoring Suppl ([employer] WIRELESS 2) w/Device KIT Use as directed to monitor blood sugars 1 kit 0    blood glucose test strips (Power Assure JAZZ TEST) strip Use to test blood sugar 1 time daily 100 each 3    AgaMatrix Ultra-Thin Lancets MISC Use to test blood sugar 1 time daily 100 each 3    Blood Glucose Calibration (AGAPreventiceIX CONTROL) SOLN Use as directed for control solution test 1 each 0    buPROPion (WELLBUTRIN XL) 300 MG extended release tablet Take 1 tablet by mouth every morning 90 tablet 1    levothyroxine (SYNTHROID) 50 MCG tablet TAKE 1 TABLET BY MOUTH ONE TIME DAILY 90 tablet 1    metoprolol tartrate (LOPRESSOR) 25 MG tablet Take 1 tablet by mouth 2 times daily 180 tablet 1    pravastatin (PRAVACHOL) 20 MG tablet TAKE ONE TABLET BY MOUTH EVERY EVENING 90 tablet 1    lisinopril (PRINIVIL;ZESTRIL) 5 MG tablet Take 1 tab po daily AM 90 tablet 1    hydrochlorothiazide (HYDRODIURIL) 25 MG tablet Take 1 tablet by mouth every morning 90 tablet 1    metFORMIN (GLUCOPHAGE) 500 MG tablet TAKE ONE TABLET BY MOUTH TWICE A DAY WITH MEALS 180 tablet 1    albuterol sulfate HFA (PROAIR HFA) 108 (90 Base) MCG/ACT inhaler Inhale 2 puffs into the lungs every 6 methocarbamol (ROBAXIN) 500 MG tablet; Take 0.5 tablets by mouth nightly as needed (back/hip spasm)    Colitis    Repeat labs today. Has follow up tomorrow with GI. Discussed restarting Trulicity as colitis most likely not related. Orders Placed This Encounter   Procedures    LUKASZ DIGITAL SCREEN W OR WO CAD BILATERAL     Standing Status:   Future     Standing Expiration Date:   2021     Order Specific Question:   Reason for exam:     Answer:   screening    CT Lung Screen (Annual)     Age: Patient is 63 y.o. Smoking History: Social History    Tobacco Use      Smoking status: Former Smoker        Packs/day: 1.00        Years: 30.00        Pack years: 30        Types: Cigarettes        Start date: 3/5/1975        Quit date: 2005        Years since quittin.9      Smokeless tobacco: Never Used      Tobacco comment: 3/15/18 started age 16    Alcohol use: No      Comment: not at all     Drug use: No   Pack years: 27    Date of last lung cancer screening: [unfilled]     Standing Status:   Future     Standing Expiration Date:   2021     Order Specific Question:   Is there documentation of shared decision making? Answer:   Yes     Order Specific Question:   Is this a low dose CT or a routine CT? Answer:   Low Dose CT [1]     Order Specific Question:   Is this the first (baseline) CT or an annual exam?     Answer: Annual [2]     Order Specific Question:   Does the patient show any signs or symptoms of lung cancer? Answer:   No     Order Specific Question:   Smoking Status? Answer: Former Smoker [4]     Order Specific Question:   Date quit smoking? (must be within 15 years)     Answer:   2005     Order Specific Question:   Smoking packs per day? Answer:   1     Order Specific Question:   Years smoking?      Answer:   30    CBC     Standing Status:   Future     Number of Occurrences:   1     Standing Expiration Date:   2021    Basic Metabolic Panel     Standing Status:   Future     Number of Occurrences:   1     Standing Expiration Date:   6/1/2021    [facility] VISIT TO DISCUSS LUNG CA SCREEN W LDCT     Orders Placed This Encounter   Medications    celecoxib (CELEBREX) 200 MG capsule     Sig: Take 1 capsule by mouth daily For low back/hip pain. Dispense:  90 capsule     Refill:  0    methocarbamol (ROBAXIN) 500 MG tablet     Sig: Take 0.5 tablets by mouth nightly as needed (back/hip spasm)     Dispense:  10 tablet     Refill:  0     Medications Discontinued During This Encounter   Medication Reason    dicyclomine (BENTYL) 10 MG capsule LIST CLEANUP    Dulaglutide (TRULICITY) 6.57 ZE/9.2SL SOPN Side effects    celecoxib (CELEBREX) 100 MG capsule REORDER     Return in about 3 months (around 9/1/2020) for follow up. Reviewed with the patient: current clinical status, medications, activities and diet. Side effects, adverse effects of the medication prescribed today, as well as treatment plan/ rationale and result expectations have been discussed with the patient who expresses understanding and desires to proceed. Close follow up to evaluate treatment results and for coordination of care. I have reviewed the patient's medical history in detail and updated the computerized patient record.     Harmony Tyson PA-C

## 2025-03-11 ENCOUNTER — TELEPHONE (OUTPATIENT)
Dept: PRIMARY CARE | Facility: CLINIC | Age: 50
End: 2025-03-11
Payer: MEDICARE

## 2025-04-07 DIAGNOSIS — E78.2 ELEVATED TRIGLYCERIDES WITH HIGH CHOLESTEROL: ICD-10-CM

## 2025-04-08 RX ORDER — GEMFIBROZIL 600 MG/1
600 TABLET, FILM COATED ORAL DAILY
Qty: 30 TABLET | Refills: 0 | Status: SHIPPED | OUTPATIENT
Start: 2025-04-08

## 2025-04-23 ENCOUNTER — OFFICE VISIT (OUTPATIENT)
Dept: PRIMARY CARE | Facility: CLINIC | Age: 50
End: 2025-04-23
Payer: MEDICARE

## 2025-04-23 VITALS
WEIGHT: 194 LBS | OXYGEN SATURATION: 95 % | HEIGHT: 69 IN | BODY MASS INDEX: 28.73 KG/M2 | DIASTOLIC BLOOD PRESSURE: 70 MMHG | HEART RATE: 100 BPM | RESPIRATION RATE: 22 BRPM | SYSTOLIC BLOOD PRESSURE: 115 MMHG

## 2025-04-23 DIAGNOSIS — K13.79 MOUTH SORES: ICD-10-CM

## 2025-04-23 DIAGNOSIS — E23.0 HYPOPITUITARISM (MULTI): ICD-10-CM

## 2025-04-23 DIAGNOSIS — R03.0 ELEVATED BLOOD PRESSURE READING: ICD-10-CM

## 2025-04-23 DIAGNOSIS — E78.00 HYPERCHOLESTEREMIA: ICD-10-CM

## 2025-04-23 DIAGNOSIS — M54.12 CERVICAL RADICULOPATHY AT C5: ICD-10-CM

## 2025-04-23 DIAGNOSIS — I10 BENIGN ESSENTIAL HYPERTENSION: ICD-10-CM

## 2025-04-23 DIAGNOSIS — E29.1 HYPOGONADISM MALE: ICD-10-CM

## 2025-04-23 DIAGNOSIS — E78.2 ELEVATED TRIGLYCERIDES WITH HIGH CHOLESTEROL: ICD-10-CM

## 2025-04-23 DIAGNOSIS — M54.50 CHRONIC BILATERAL LOW BACK PAIN WITHOUT SCIATICA: ICD-10-CM

## 2025-04-23 DIAGNOSIS — D72.825 BANDEMIA: ICD-10-CM

## 2025-04-23 DIAGNOSIS — N40.0 BENIGN PROSTATIC HYPERPLASIA WITHOUT LOWER URINARY TRACT SYMPTOMS: ICD-10-CM

## 2025-04-23 DIAGNOSIS — E88.9 METABOLIC DISORDER: ICD-10-CM

## 2025-04-23 DIAGNOSIS — F51.01 PRIMARY INSOMNIA: ICD-10-CM

## 2025-04-23 DIAGNOSIS — R53.83 MALAISE AND FATIGUE: Primary | ICD-10-CM

## 2025-04-23 DIAGNOSIS — I87.2 CHRONIC VENOUS INSUFFICIENCY: ICD-10-CM

## 2025-04-23 DIAGNOSIS — R53.81 MALAISE AND FATIGUE: Primary | ICD-10-CM

## 2025-04-23 DIAGNOSIS — G89.29 CHRONIC BILATERAL LOW BACK PAIN WITHOUT SCIATICA: ICD-10-CM

## 2025-04-23 DIAGNOSIS — H66.92 OTITIS OF LEFT EAR: ICD-10-CM

## 2025-04-23 PROBLEM — G99.2 STENOSIS OF CERVICAL SPINE WITH MYELOPATHY: Status: RESOLVED | Noted: 2023-10-08 | Resolved: 2025-04-23

## 2025-04-23 PROBLEM — M48.02 STENOSIS OF CERVICAL SPINE WITH MYELOPATHY: Status: RESOLVED | Noted: 2023-10-08 | Resolved: 2025-04-23

## 2025-04-23 PROCEDURE — G2211 COMPLEX E/M VISIT ADD ON: HCPCS | Performed by: INTERNAL MEDICINE

## 2025-04-23 PROCEDURE — 3008F BODY MASS INDEX DOCD: CPT | Performed by: INTERNAL MEDICINE

## 2025-04-23 PROCEDURE — 1036F TOBACCO NON-USER: CPT | Performed by: INTERNAL MEDICINE

## 2025-04-23 PROCEDURE — 3078F DIAST BP <80 MM HG: CPT | Performed by: INTERNAL MEDICINE

## 2025-04-23 PROCEDURE — 99214 OFFICE O/P EST MOD 30 MIN: CPT | Performed by: INTERNAL MEDICINE

## 2025-04-23 PROCEDURE — 3074F SYST BP LT 130 MM HG: CPT | Performed by: INTERNAL MEDICINE

## 2025-04-23 RX ORDER — PAROXETINE HYDROCHLORIDE 40 MG/1
40 TABLET, FILM COATED ORAL EVERY MORNING
COMMUNITY
Start: 2025-04-04

## 2025-04-23 RX ORDER — DOXYCYCLINE 100 MG/1
100 CAPSULE ORAL 2 TIMES DAILY
Qty: 28 CAPSULE | Refills: 0 | Status: SHIPPED | OUTPATIENT
Start: 2025-04-23 | End: 2025-05-07

## 2025-04-23 RX ORDER — LIOTHYRONINE SODIUM 25 UG/1
25 TABLET ORAL DAILY
COMMUNITY
Start: 2025-04-21

## 2025-04-23 RX ORDER — VALACYCLOVIR HYDROCHLORIDE 1 G/1
1000 TABLET, FILM COATED ORAL EVERY 12 HOURS
Qty: 60 TABLET | Refills: 0 | Status: SHIPPED | OUTPATIENT
Start: 2025-04-23

## 2025-04-23 RX ORDER — NAPROXEN 500 MG/1
500 TABLET ORAL
Qty: 90 TABLET | Refills: 0 | Status: SHIPPED | OUTPATIENT
Start: 2025-04-23

## 2025-04-23 RX ORDER — FINASTERIDE 5 MG/1
2.5 TABLET, FILM COATED ORAL 3 TIMES WEEKLY
Qty: 45 TABLET | Refills: 0 | Status: SHIPPED | OUTPATIENT
Start: 2025-04-23

## 2025-04-23 RX ORDER — GEMFIBROZIL 600 MG/1
600 TABLET, FILM COATED ORAL DAILY
Qty: 30 TABLET | Refills: 0 | Status: SHIPPED | OUTPATIENT
Start: 2025-04-23

## 2025-04-23 RX ORDER — TRAMADOL HYDROCHLORIDE 50 MG/1
50 TABLET ORAL EVERY 6 HOURS PRN
Qty: 28 TABLET | Refills: 0 | Status: SHIPPED | OUTPATIENT
Start: 2025-04-23 | End: 2025-04-30

## 2025-04-23 RX ORDER — LISDEXAMFETAMINE DIMESYLATE 70 MG/1
1 CAPSULE ORAL
COMMUNITY
Start: 2025-04-07

## 2025-04-23 ASSESSMENT — ENCOUNTER SYMPTOMS
CARDIOVASCULAR NEGATIVE: 1
ALLERGIC/IMMUNOLOGIC NEGATIVE: 1
RESPIRATORY NEGATIVE: 1
NEUROLOGICAL NEGATIVE: 1
ENDOCRINE NEGATIVE: 1
MUSCULOSKELETAL NEGATIVE: 1
GASTROINTESTINAL NEGATIVE: 1
PSYCHIATRIC NEGATIVE: 1
EYES NEGATIVE: 1
CONSTITUTIONAL NEGATIVE: 1
HEMATOLOGIC/LYMPHATIC NEGATIVE: 1

## 2025-04-23 NOTE — ASSESSMENT & PLAN NOTE
CRI - Chronic Renal Insuficiency. Secondary to DM/HTN/Atherosclerosis. Follow BUN/Cr. and lytes.   RENOPROTECTION with ACEI/ARB (). Monitor microalbuminuria. Avoid hypotension and renal hypoperfusion. The patient was instructed to avoid NSAIDS and educate compliance with medications to control HTN(hypertension) and cholesterol and diabetes.

## 2025-04-23 NOTE — PROGRESS NOTES
"Subjective   Patient ID: Gael Lara is a 49 y.o. male who presents for Follow-up (Follow up for refills /Swollen lymph nodes in neck ).    HPI     Review of Systems   Constitutional: Negative.    HENT: Negative.     Eyes: Negative.    Respiratory: Negative.     Cardiovascular: Negative.    Gastrointestinal: Negative.    Endocrine: Negative.    Musculoskeletal: Negative.    Skin: Negative.    Allergic/Immunologic: Negative.    Neurological: Negative.    Hematological: Negative.    Psychiatric/Behavioral: Negative.     All other systems reviewed and are negative.      Objective   /70   Pulse 100   Resp 22   Ht 1.753 m (5' 9\")   Wt 88 kg (194 lb)   SpO2 95%   BMI 28.65 kg/m²     Physical Exam  Vitals and nursing note reviewed.   Constitutional:       Appearance: Normal appearance.   HENT:      Head: Normocephalic and atraumatic.      Right Ear: Tympanic membrane, ear canal and external ear normal.      Left Ear: Tympanic membrane, ear canal and external ear normal. There is no impacted cerumen.      Nose: Nose normal.      Mouth/Throat:      Mouth: Mucous membranes are moist.      Pharynx: Oropharynx is clear.   Eyes:      Extraocular Movements: Extraocular movements intact.      Conjunctiva/sclera: Conjunctivae normal.      Pupils: Pupils are equal, round, and reactive to light.   Cardiovascular:      Rate and Rhythm: Normal rate and regular rhythm.      Pulses: Normal pulses.      Heart sounds: Normal heart sounds. No murmur heard.  Pulmonary:      Effort: Pulmonary effort is normal. No respiratory distress.      Breath sounds: Normal breath sounds. No stridor. No wheezing, rhonchi or rales.   Chest:      Chest wall: No tenderness.   Abdominal:      General: Abdomen is flat. Bowel sounds are normal. There is no distension.      Palpations: Abdomen is soft. There is no mass.      Tenderness: There is no abdominal tenderness. There is no right CVA tenderness, left CVA tenderness, guarding or rebound.     "  Hernia: No hernia is present.   Musculoskeletal:         General: Normal range of motion.      Cervical back: Normal range of motion and neck supple.   Skin:     General: Skin is warm.      Capillary Refill: Capillary refill takes less than 2 seconds.   Neurological:      General: No focal deficit present.      Mental Status: He is alert.      Cranial Nerves: No cranial nerve deficit.      Sensory: No sensory deficit.      Motor: No weakness.      Coordination: Coordination normal.      Gait: Gait normal.      Deep Tendon Reflexes: Reflexes normal.   Psychiatric:         Mood and Affect: Mood normal.         Behavior: Behavior normal. Behavior is cooperative.         Thought Content: Thought content normal.         Judgment: Judgment normal.         Assessment/Plan   Problem List Items Addressed This Visit           ICD-10-CM    Benign essential hypertension I10    HTN - hypertension well/controlled .Target BP < 130/80 achieved. Educate low salt diet and exercise with weight loss. Educate home self monitoring and diary keeping. Educate risks of elevate blood pressure and benefits of prompt treatment. Refill hydrochlorothiazide          Orders:    hydroCHLOROthiazide (Microzide) 12.5 mg tablet; Take 1 tablet (12.5 mg) by mouth once daily in the morning.            Mouth sores K13.79    Relevant Medications    valACYclovir (Valtrex) 1 gram tablet    Cervical radiculopathy at C5 M54.12    DDD - Degenerative disc disease of the )/Cervical (C)  spine. Educate exercises and referred patient to Physical Therapy (PT). Ordered X-Ray's of the /C spine. Consider MRI. Radiculopathy in the distribution of C4-C5-C6 nerve roots, needs NSAIDS (Naprosin 500mg BID vs. Arthrotec 75mg BID or Prednisone taper (Medrol dose pack), Flexeril 10 mg qHS, heat application, and pain control with Tylenol vs. Vicodin 5/325 mg TID.           Chronic venous insufficiency I87.2    Edema - and leg swelling dur to venous insufficiency - responding to  low dose Furosemide and recommend: leg elevation and compression stockings -          Elevated blood pressure reading R03.0    HTN - hypertension well/controlled .Target BP < 130/80  achieved. Educate low salt diet and exercise with weight loss. Educate home self monitoring and diary keeping. Educate risks of elevate blood pressure and benefits of prompt treatment.  Refill hydrochlorothiazide          Hypogonadism male E29.1    Hypogonadism - monitor Testosterone level - and supplement 300 mg IM - now - inject Testosterone 300 mg IM. Repeat on a monthly basis. Monitor Testosterone level and symptoms ( fatigue, decrease libido, muscle weakness). Educate the patient upon the risks of Stroke, heart attacks and possible venous thromboembolism with possible pulmonary embolism and even death associated with Testosterone treatment . The patient understood the risks associated with Testosterone treatment and whishes to continue the Testosterone supplement and aggress with a plan to control tightly the Blood pressure and cholesterol and take a Baby aspirin 81 mg daily to prevent these complications. He enjoys the benefits of the Testosterone. Also check Prolactin and LH and FSH levels to rule out pituitary adenoma and prolactinoma.             Insomnia G47.00    Insomnia - Educate sleep hygiene, avoid naps. Avoid excessive coffee or chocolate. Consider relaxation techniques. Start initially Melatonin and Tylenol 500 mg at bed time. If no success then start Hydroxyzine 25 mg at bedtime           Malaise and fatigue - Primary R53.81, R53.83    Fatigue - check CMP(metabolic panel and elctrolytes) , CBC(complete blood cell count), TSH(thyroid function). Insomnia may play a role and sleep studies(rule out sleep apnea) are recommended . Educate sleep hygiene. Consider anxiety disorder vs. depression. Consider Stress test, and 2DECHO.           Metabolic disorder E88.9    Educate extensively low carbohydrate diet AND LOW FAT DIET  AND increase in exercise activity  and weight loss program and monitor HbA1C and glucose levels and consider Metformin 500 mg BID with meals            Leucocytosis D72.829    Monitor CBC          Hypercholesteremia E78.00    Hypercholesterolemia - Monitor lipid profile and educate patient upon risks of high cholesterol and targets. Educate diet and change in lifestyle and increase in exercises - Refill: and educate compliance with medication and diet.          Hypopituitarism (Multi) E23.0    Monitor TSH and FSH and LH          Other Visit Diagnoses         Codes      Elevated triglycerides with high cholesterol     E78.2    Relevant Medications    gemfibrozil (Lopid) 600 mg tablet      Chronic bilateral low back pain without sciatica     M54.50, G89.29    Relevant Medications    naproxen (Naprosyn) 500 mg tablet      Benign prostatic hyperplasia without lower urinary tract symptoms     N40.0    Relevant Medications    finasteride (Proscar) 5 mg tablet            Expand All Collapse All  Subjective  Reason for Visit: Gael Lara is an 49 y.o. male here for a Medicare Wellness visit.         Reviewed all medications by prescribing practitioner or clinical pharmacist (such as prescriptions, OTCs, herbal therapies and supplements) and documented in the medical record.     HPI     Patient Care Team:  Jarek Mandujano MD as PCP - General (Internal Medicine)  Jarek Mandujano MD as PCP - [employer] PCP      Review of Systems   Constitutional: Negative.    HENT: Negative.     Eyes: Negative.    Respiratory: Negative.     Cardiovascular: Negative.    Gastrointestinal: Negative.    Endocrine: Negative.    Musculoskeletal: Negative.    Skin: Negative.    Allergic/Immunologic: Negative.    Neurological: Negative.    Hematological: Negative.    Psychiatric/Behavioral: Negative.     All other systems reviewed and are negative.        Objective  Vitals:  /80   Pulse 90   Resp 21   Ht 1.753 m (5'  "9\")   Wt 93 kg (205 lb)   SpO2 98%   BMI 30.27 kg/m²        Physical Exam  Vitals and nursing note reviewed.   Constitutional:       Appearance: Normal appearance.   HENT:      Head: Normocephalic and atraumatic.      Right Ear: Tympanic membrane, ear canal and external ear normal.      Left Ear: Tympanic membrane, ear canal and external ear normal. There is no impacted cerumen.      Nose: Nose normal.      Mouth/Throat:      Mouth: Mucous membranes are moist.      Pharynx: Oropharynx is clear.   Eyes:      Extraocular Movements: Extraocular movements intact.      Conjunctiva/sclera: Conjunctivae normal.      Pupils: Pupils are equal, round, and reactive to light.   Cardiovascular:      Rate and Rhythm: Normal rate and regular rhythm.      Pulses: Normal pulses.      Heart sounds: Normal heart sounds. No murmur heard.  Pulmonary:      Effort: Pulmonary effort is normal. No respiratory distress.      Breath sounds: Normal breath sounds. No stridor. No wheezing, rhonchi or rales.   Chest:      Chest wall: No tenderness.   Abdominal:      General: Abdomen is flat. Bowel sounds are normal. There is no distension.      Palpations: Abdomen is soft. There is no mass.      Tenderness: There is no abdominal tenderness. There is no right CVA tenderness, left CVA tenderness, guarding or rebound.      Hernia: No hernia is present.   Musculoskeletal:         General: Normal range of motion.      Cervical back: Normal range of motion and neck supple.   Skin:     General: Skin is warm.      Capillary Refill: Capillary refill takes less than 2 seconds.   Neurological:      General: No focal deficit present.      Mental Status: He is alert.      Cranial Nerves: No cranial nerve deficit.      Sensory: No sensory deficit.      Motor: No weakness.      Coordination: Coordination normal.      Gait: Gait normal.      Deep Tendon Reflexes: Reflexes normal.   Psychiatric:         Mood and Affect: Mood normal.         Behavior: Behavior " normal. Behavior is cooperative.         Thought Content: Thought content normal.         Judgment: Judgment normal.              Assessment & Plan  Elevated triglycerides with high cholesterol     Orders:    gemfibrozil (Lopid) 600 mg tablet; Take 1 tablet (600 mg) by mouth once daily.     Benign essential hypertension  HTN - hypertension well/controlled .Target BP < 130/80 achieved. Educate low salt diet and exercise with weight loss. Educate home self monitoring and diary keeping. Educate risks of elevate blood pressure and benefits of prompt treatment. Refill hydrochlorothiazide          Orders:    hydroCHLOROthiazide (Microzide) 12.5 mg tablet; Take 1 tablet (12.5 mg) by mouth once daily in the morning.     Diabetes mellitus due to underlying condition without complication, without long-term current use of insulin (Multi)     Orders:    metFORMIN (Glucophage) 500 mg tablet; Take 1 tablet (500 mg) by mouth once daily.     Pain     Orders:    baclofen (Lioresal) 5 mg tablet; Take 1 tablet (5 mg) by mouth 3 times a day.         Chronic venous insufficiency  Edema - and leg swelling dur to venous insufficiency - responding to low dose Furosemide and recommend: leg elevation and compression stockings -         Hypercholesteremia  Hypercholesterolemia - Monitor lipid profile and educate patient upon risks of high cholesterol and targets. Educate diet and change in lifestyle and increase in exercises - Refill:   and educate compliance with medication and diet.          Metabolic disorder  Educate extensively low carbohydrate diet AND LOW FAT DIET AND increase in exercise activity  and weight loss program and monitor HbA1C and glucose levels and consider Metformin 500 mg BID with meals           Hypogonadism male  Hypogonadism - monitor Testosterone level - and supplement 300 mg IM - now - inject Testosterone 300 mg IM. Repeat on a monthly basis. Monitor Testosterone level and symptoms ( fatigue, decrease libido,  muscle weakness). Educate the patient upon the risks of Stroke, heart attacks and possible venous thromboembolism with possible pulmonary embolism and even death associated with Testosterone treatment . The patient understood the risks associated with Testosterone treatment and whishes to continue the Testosterone supplement and aggress with a plan to control tightly the Blood pressure and cholesterol and take a Baby aspirin 81 mg daily to prevent these complications. He enjoys the benefits of the Testosterone. Also check Prolactin and LH and FSH levels to rule out pituitary adenoma and prolactinoma.      Immunosuppression  Needs vaccinations and Prophylactic treatment         Degenerative disc disease, cervical  DDD - Degenerative disc disease of the )/Cervical (C)  spine. Educate exercises and referred patient to Physical Therapy (PT). Ordered X-Ray's of the /C spine. Consider MRI. Radiculopathy in the distribution of C4-C5-C6 nerve roots, needs NSAIDS (Naprosin 500mg BID vs. Arthrotec 75mg BID or Prednisone taper (Medrol dose pack), Flexeril 10 mg qHS, heat application, and pain control with Tylenol vs. Vicodin 5/325 mg TID.       Orders:    acetaminophen-codeine (Tylenol w/ Codeine #3) 300-30 mg tablet; Take 1 tablet by mouth every 6 hours if needed for severe pain (7 - 10) for up to 5 days.     Malaise and fatigue  Fatigue - check CMP(metabolic panel and elctrolytes) , CBC(complete blood cell count), TSH(thyroid function). Insomnia may play a role and sleep studies(rule out sleep apnea) are recommended . Educate sleep hygiene. Consider anxiety disorder vs. depression. Consider Stress test, and 2DECHO.          Bipolar II disorder (Multi)                 Cardiac Risk Assessment  15 - 20 minutes were spent discussing Cardiovascular risk and, if needed, lifestyle modifications were recommended, including nutritional choices, exercise, and elimination of habits contributing to risk.   Aspirin use/disuse was  discussed following the guidelines below:  low dose ASA ( mg) should be considered:     If prior Heart Attack/Stroke/Peripheral vascular disease:  Generally recommend daily low dose aspirin unless extremely high bleeding risk (e.g., gastrointestinal).     If no prior Heart Attack/Stroke/Peripheral vascular disease:                                   Age over 70: Do not use Aspirin for prevention                          Age less than 70 and 10-year cardiovascular disease risk is >20%: use low dose Aspirin for prevention.                                            Benign essential hypertension I10         HTN - hypertension well/controlled .Target BP < 130/80 achieved. Educate low salt diet and exercise with weight loss. Educate home self monitoring and diary keeping. Educate risks of elevate blood pressure and benefits of prompt treatment. Refill hydrochlorothiazide            Relevant Medications     hydroCHLOROthiazide (Microzide) 12.5 mg tablet     Chronic venous insufficiency I87.2       Edema - and leg swelling dur to venous insufficiency - responding to low dose Furosemide and recommend: leg elevation and compression stockings -            Decreased thyroid stimulating hormone (TSH) level R79.89       Hypothyroidism - Symptoms well/not controlled (weight gain, fatigue, constipation, cold intolerance). Check TSH continue/change dose of Synthroid/Levothyroxine  of  mcg/qD.            Hypogonadism male E29.1       Hypogonadism - monitor Testosterone level - and supplement 300 mg IM - now - inject Testosterone 300 mg IM. Repeat on a monthly basis. Monitor Testosterone level and symptoms ( fatigue, decrease libido, muscle weakness). Educate the patient upon the risks of Stroke, heart attacks and possible venous thromboembolism with possible pulmonary embolism and even death associated with Testosterone treatment . The patient understood the risks associated with Testosterone treatment and whishes to continue  the Testosterone supplement and aggress with a plan to control tightly the Blood pressure and cholesterol and take a Baby aspirin 81 mg daily to prevent these complications. He enjoys the benefits of the Testosterone. Also check Prolactin and LH and FSH levels to rule out pituitary adenoma and prolactinoma.            Idiopathic peripheral neuropathy G60.9       Neuropathy/ - positive numbness, tingling, discomfort (needles pricking, cold feeling) in distal lower extremities(toes, feet, legs), secondary to DM/Radiculopathy/idiopathic. Recommend: Gabapentin(Neurontin) 300 daily(at bed time) upward taper up to 2 gm/day or Lyrica 75 mg daily if failure of treatment. Also recommend: treatment of Diabetes(control levels of blood sugars), lumbar/ cervical disc disease (Physical Therapy), and check electrolytes and Thyroid function. Also address regenrative measures: B12 intrmusculary (Monthly) and Folic acid supplementation. Recommend EMG. Start Amitriptyline 25 mg daily / . Start - Tonic water - and Tramadol 50 mg TID.            Insomnia G47.00     Metabolic disorder E88.9       Educate extensively low carbohydrate diet AND LOW FAT DIET AND increase in exercise activity  and weight loss program and monitor HbA1C and glucose levels and consider Metformin 500 mg BID with meals              Pain R52     Relevant Medications     baclofen (Lioresal) 5 mg tablet     Hypercholesteremia E78.00       Hypercholesterolemia - Monitor lipid profile and educate patient upon risks of high cholesterol and targets. Educate diet and change in lifestyle and increase in exercises - Refill:   and educate compliance with medication and diet.             Relevant Orders     Lipid Panel     Medicare annual wellness visit, subsequent - Primary Z00.00       No recent hospitalizations.     All medications reviewed and reconciled by me the provider..  No use of controlled substances or opiates.     Family history, social history reviewed, no  changes.     Patient does not smoke.     Patient does not drink.     Patient hydrates adequately daily.  Eats a well-balanced healthy diet.      Exercises adequately including walking and doing weightbearing exercises.     Patient denies any difficulty with memory or cognition.      Denies any difficulty with hearing.  Patient does not wear hearing aids.     No fall risk.  No recent falls.  Denies any difficulty walking.     Patient with no history of depression anxiety, denies any loss of interest, no feeling of sadness, no lack of motivation.     Patient is independent in all ADLs and IADLs.  Independent bathing, dressing, walking.  Takes care of own finances, shopping and cooking.      End-of-life decision-making power of  reviewed with patient.      Risk Factors Identified During Visit: None.   Influenza: influenza vaccine was previously given.   Pneumovax 23: Pneumovax 23 vaccine was previously given.   Prevnar 13: Prevnar 13 vaccine was previously given.   Shingles Vaccine: Shingles vaccine was previously given.   Prostate cancer screening: Screening is current.   Colorectal Cancer Screening: screening is current.   Abdominal Aortic Aneurysm screening: screening is current.   HIV screening: screening not indicated.        Preventive measures - Recommend ASAP : Refer to GI for  Colonoscopy (educate patient risks of colon cancer) refer patient to GI specialist. Ophthalmology and retina exam recommend yearly exams refer patient to an Ophthalmologist. BPH - (Benign Prostatic Hypertrophy) refer patient to an Urologist for rectal exam and PSA check.            Relevant Orders     Prostate Specific Antigen      Other Visit Diagnoses           Codes     Colon cancer screening     Z12.11     Relevant Orders     Colonoscopy Screening; Average Risk Patient     Other fatigue     R53.83     Relevant Orders     CBC and Auto Differential     Comprehensive Metabolic Panel     TSH with reflex to Free T4 if abnormal      Hyperglycemia     R73.9     Relevant Orders     Hemoglobin A1C                                  Benign essential hypertension - Primary I10          HTN - hypertension well/controlled .Target BP < 130/80 achieved. Educate low salt diet and exercise with weight loss. Educate home self monitoring and diary keeping. Educate risks of elevate blood pressure and benefits of prompt treatment. Refill hydrochlorothiazide            Bipolar II disorder (Multi) F31.81      Chronic venous insufficiency I87.2        Edema - and leg swelling dur to venous insufficiency - responding to low dose Furosemide and recommend: leg elevation and compression stockings -            Degenerative disc disease, cervical M50.30        DDD - Degenerative disc disease of the )/Cervical (C)  spine. Educate exercises and referred patient to Physical Therapy (PT). Ordered X-Ray's of the /C spine. Consider MRI. Radiculopathy in the distribution of C4-C5-C6 nerve roots, needs NSAIDS (Naprosin 500mg BID vs. Arthrotec 75mg BID or Prednisone taper (Medrol dose pack), Flexeril 10 mg qHS, heat application, and pain control with Tylenol vs. Vicodin 5/325 mg TID.             Malaise and fatigue R53.81, R53.83        Fatigue - check CMP(metabolic panel and elctrolytes) , CBC(complete blood cell count), TSH(thyroid function). Insomnia may play a role and sleep studies(rule out sleep apnea) are recommended . Educate sleep hygiene. Consider anxiety disorder vs. depression. Consider Stress test, and 2DECHO.             Metabolic disorder E88.9        Educate extensively low carbohydrate diet AND LOW FAT DIET AND increase in exercise activity and weight loss program and monitor HbA1C and glucose levels and consider Metformin 500 mg BID with meals and Ozempic                            Benign essential hypertension I10           HTN - hypertension well/controlled .Target BP < 130/80  achieved. Educate low salt diet and exercise with weight loss. Educate home self  monitoring and diary keeping. Educate risks of elevate blood pressure and benefits of prompt treatment.  Refill hydrochlorothiazide            Relevant Medications       hydroCHLOROthiazide (Microzide) 12.5 mg tablet       Other Relevant Orders       Comprehensive Metabolic Panel       Mouth sores K13.79       Relevant Medications       valACYclovir (Valtrex) 1 gram tablet       doxycycline (Monodox) 100 mg capsule       Chronic venous insufficiency - Primary I87.2         Edema - and leg swelling dur to venous insufficiency - responding to low dose Furosemide and recommend: leg elevation and compression stockings -            Hypogonadism male E29.1         Hypogonadism  - monitor  Testosterone level  - and supplement 300 mg IM  - now  - inject Testosterone 300 mg IM. Repeat on a monthly basis. Monitor Testosterone level and symptoms ( fatigue, decrease libido, muscle weakness). Educate the patient upon the risks of Stroke, heart attacks and possible venous thromboembolism with possible pulmonary embolism and even death associated with Testosterone treatment .  The patient understood the risks associated with Testosterone treatment and whishes to continue the Testosterone supplement and aggress with a plan to control tightly the Blood pressure and cholesterol and take a Baby aspirin 81 mg daily to prevent these complications. He enjoys the benefits of the Testosterone. Also check Prolactin and LH and FSH levels to rule out pituitary adenoma and prolactinoma.            Idiopathic peripheral neuropathy G60.9         Neuropathy/ - positive numbness, tingling, discomfort (needles pricking, cold feeling) in distal lower extremities(toes, feet, legs), secondary to DM/Radiculopathy/idiopathic. Recommend: Gabapentin(Neurontin) 300 daily(at bed time) upward taper up to  2 gm/day or Lyrica 75 mg daily if failure of treatment. Also recommend: treatment of Diabetes(control levels of blood sugars), lumbar/ cervical  disc  disease (Physical Therapy), and check electrolytes and Thyroid function. Also address regenrative measures: B12 intrmusculary (Monthly) and Folic acid supplementation. Recommend  EMG. Start Amitriptyline 25 mg daily / . Start - Tonic water - and Tramadol 50 mg TID.             Immunosuppression (Multi) D84.9         Needs vaccinations and Prophylactic treatment            Insomnia G47.00       Metabolic disorder E88.9         Educate extensively low carbohydrate diet AND LOW FAT DIET AND increase in exercise activity and weight loss program and monitor HbA1C and glucose levels and consider Metformin 500 mg BID with meals            Obstructive sleep apnea syndrome G47.33        Other Visit Diagnoses           Codes     Diabetes mellitus due to underlying condition without complication, without long-term current use of insulin (Multi)     E08.9     Relevant Medications     metFORMIN (Glucophage) 500 mg tablet     Other Relevant Orders     Lipid Panel     Hemoglobin A1C     Other fatigue     R53.83     Relevant Orders     CBC and Auto Differential     TSH with reflex to Free T4 if abnormal                                                     Abnormal kidney function N28.9             CRI - Chronic Renal Insuficiency. Secondary to DM/HTN/Atherosclerosis. Follow BUN/Cr. and lytes.   RENOPROTECTION with ACEI/ARB (). Monitor microalbuminuria. Avoid hypotension and renal hypoperfusion. The patient was instructed to avoid NSAIDS and educate compliance with medications to control HTN(hypertension) and cholesterol and diabetes.                                         Benign essential hypertension I10           HTN - hypertension well/controlled .Target BP < 130/80  achieved. Educate low salt diet and exercise with weight loss. Educate home self monitoring and diary keeping. Educate risks of elevate blood pressure and benefits of prompt treatment.  Refill hydrochlorothiazide              Mouth sores K13.79         Chronic  venous insufficiency I87.2           Edema - and leg swelling dur to venous insufficiency - responding to low dose Furosemide and recommend: leg elevation and compression stockings -              Immunosuppression (CMS/HCC) D84.9           Needs vaccinations and Prophylactic treatment              Infectious mononucleosis B27.90           Chronic fatigue syndrome and consider Prednisone taper PRN and antiviral              Malaise and fatigue R53.81, R53.83           Fatigue - check CMP(metabolic panel and elctrolytes) , CBC(complete blood cell count), TSH(thyroid function). Insomnia may play a role and sleep studies(rule out sleep apnea) are recommended . Educate sleep hygiene. Consider anxiety disorder vs. depression. Consider Stress test, and 2DECHO.               Metabolic disorder - Primary E88.9           Educate extensively low carbohydrate diet AND LOW FAT DIET AND increase in exercise activity  and weight loss program and monitor HbA1C and glucose levels and consider Metformin 500 mg BID with meals                Obstructive sleep apnea syndrome G47.33           Sleep apnea/nocturnal hypoxia - Not/ Witnessed - The patient is complaining of day-time sleepiness(falling asleep easily/  narcolepsy) while driving or sitting still. Also patient complains of major tiredness/ fatigue, multiple episodes of night time awakenings(unexpalined). Also patient is at high risk for sleep apnea: overweight, small throat opening and enlarged (kissing) tonsils, sedentary lifestyle, sleeping aides. Recommend: Sleep studies: Nocturnal oxymetry, sleep lab. Consider CPAP vs. Oxygen per Nasal Canula at night at 2L/min. for nocturnal hypoxia                            Immunizations/Injections       very important  Immunizations from outside sources need reconciliation.       Flu vaccine, quadrivalent, no egg protein, age 6 month or greater (FLUCELVAX)9/20/2023, 11/4/2022  Influenza, Unspecified1/4/2011  Influenza, injectable,  quadrivalent9/26/2018  Novel influenza-H1N1-09, preservative-free12/28/2009  Pfizer COVID-19 vaccine, bivalent, age 12 years and older (30 mcg/0.3 mL)11/4/2022  Pfizer Gray Cap SARS-CoV-29/20/2023  Pfizer Purple Cap SARS-CoV-212/13/2021, 4/20/2021, 3/30/2021  Pneumococcal polysaccharide vaccine, 23-valent, age 2 years and older (PNEUMOVAX 23)6/22/2018  Tdap vaccine, age 7 year and older (BOOSTRIX, ADACEL)3/14/2017                   Immunizations/Injections       very important  Immunizations from outside sources need reconciliation.       Flu vaccine, quadrivalent, no egg protein, age 6 month or greater (FLUCELVAX)9/20/2023, 11/4/2022  Influenza, Unspecified1/4/2011  Influenza, injectable, quadrivalent9/26/2018  Novel influenza-H1N1-09, preservative-free12/28/2009  Pfizer COVID-19 vaccine, bivalent, age 12 years and older (30 mcg/0.3 mL)11/4/2022  Pfizer Gray Cap SARS-CoV-29/20/2023  Pfizer Purple Cap SARS-CoV-212/13/2021, 4/20/2021, 3/30/2021  Pneumococcal polysaccharide vaccine, 23-valent, age 2 years and older (PNEUMOVAX 23)6/22/2018  Tdap vaccine, age 7 year and older (BOOSTRIX, ADACEL)3/14/2017                  Immunizations/Injections       very important  Immunizations from outside sources need reconciliation.       Flu vaccine, quadrivalent, no egg protein, age 6 month or greater (FLUCELVAX)9/20/2023, 11/4/2022  Influenza, Unspecified1/4/2011  Influenza, injectable, quadrivalent9/26/2018  Novel influenza-H1N1-09, preservative-free12/28/2009  Pfizer COVID-19 vaccine, bivalent, age 12 years and older (30 mcg/0.3 mL)11/4/2022  Pfizer Gray Cap SARS-CoV-29/20/2023  Pfizer Purple Cap SARS-CoV-212/13/2021, 4/20/2021, 3/30/2021  Pneumococcal polysaccharide vaccine, 23-valent, age 2 years and older (PNEUMOVAX 23)6/22/2018  Tdap vaccine, age 7 year and older (BOOSTRIX, ADACEL)3/14/2017             Immunizations/Injections      very important  Immunizations from outside sources need reconciliation.      COVID-19, mRNA,  LNP-S, PF, 30 mcg/0.3 mL dose12/13/2021  Flu vaccine, quadrivalent, no egg protein, age 6 month or greater (FLUCELVAX)9/20/2023, 11/4/2022  Influenza, Unspecified1/4/2011  Influenza, injectable, quadrivalent9/26/2018  Novel influenza-H1N1-09, preservative-free12/28/2009  Pfizer COVID-19 vaccine, 12 years and older, (30mcg/0.3mL) (Comirnaty)12/4/2024  Pfizer COVID-19 vaccine, bivalent, age 12 years and older (30 mcg/0.3 mL)11/4/2022  Pfizer Gray Cap SARS-CoV-29/20/2023  Pfizer Purple Cap SARS-CoV-24/20/2021, 3/30/2021  Pneumococcal polysaccharide vaccine, 23-valent, age 2 years and older (PNEUMOVAX 23)6/22/2018  Tdap vaccine, age 7 year and older (BOOSTRIX, ADACEL)3/14/2017

## 2025-04-23 NOTE — ASSESSMENT & PLAN NOTE
Insomnia - Educate sleep hygiene, avoid naps. Avoid excessive coffee or chocolate. Consider relaxation techniques. Start initially Melatonin and Tylenol 500 mg at bed time. If no success then start Hydroxyzine 25 mg at bedtime     No assistance needed

## 2025-04-28 ENCOUNTER — TELEPHONE (OUTPATIENT)
Dept: PRIMARY CARE | Facility: CLINIC | Age: 50
End: 2025-04-28
Payer: MEDICARE

## 2025-05-09 ENCOUNTER — TELEPHONE (OUTPATIENT)
Dept: PRIMARY CARE | Facility: CLINIC | Age: 50
End: 2025-05-09
Payer: MEDICARE

## 2025-05-14 ENCOUNTER — TELEPHONE (OUTPATIENT)
Dept: PRIMARY CARE | Facility: CLINIC | Age: 50
End: 2025-05-14

## 2025-05-15 DIAGNOSIS — R26.2 DIFFICULTY WALKING: ICD-10-CM

## 2025-05-15 DIAGNOSIS — M17.0 ARTHRITIS OF BOTH KNEES: ICD-10-CM

## 2025-05-15 DIAGNOSIS — R52 PAIN: ICD-10-CM

## 2025-05-15 DIAGNOSIS — M25.562 CHRONIC PAIN OF BOTH KNEES: ICD-10-CM

## 2025-05-15 DIAGNOSIS — M25.561 CHRONIC PAIN OF BOTH KNEES: ICD-10-CM

## 2025-05-15 DIAGNOSIS — G89.29 CHRONIC PAIN OF BOTH KNEES: ICD-10-CM

## 2025-05-15 RX ORDER — BACLOFEN 5 MG/1
5 TABLET ORAL 3 TIMES DAILY
Qty: 270 TABLET | Refills: 0 | Status: SHIPPED | OUTPATIENT
Start: 2025-05-15

## 2025-05-22 ENCOUNTER — APPOINTMENT (OUTPATIENT)
Dept: PRIMARY CARE | Facility: CLINIC | Age: 50
End: 2025-05-22
Payer: MEDICARE

## 2025-05-22 ENCOUNTER — OFFICE VISIT (OUTPATIENT)
Dept: OTOLARYNGOLOGY | Facility: CLINIC | Age: 50
End: 2025-05-22
Payer: MEDICARE

## 2025-05-22 VITALS — WEIGHT: 194 LBS | BODY MASS INDEX: 28.73 KG/M2 | HEIGHT: 69 IN

## 2025-05-22 DIAGNOSIS — H92.02 REFERRED OTALGIA OF LEFT EAR: ICD-10-CM

## 2025-05-22 DIAGNOSIS — J30.89 NON-SEASONAL ALLERGIC RHINITIS DUE TO OTHER ALLERGIC TRIGGER: ICD-10-CM

## 2025-05-22 DIAGNOSIS — M26.629 TMJ SYNDROME: Primary | ICD-10-CM

## 2025-05-22 DIAGNOSIS — G43.809 OTHER MIGRAINE WITHOUT STATUS MIGRAINOSUS, NOT INTRACTABLE: ICD-10-CM

## 2025-05-22 PROCEDURE — 99204 OFFICE O/P NEW MOD 45 MIN: CPT | Performed by: OTOLARYNGOLOGY

## 2025-05-22 PROCEDURE — 1036F TOBACCO NON-USER: CPT | Performed by: OTOLARYNGOLOGY

## 2025-05-22 PROCEDURE — 3008F BODY MASS INDEX DOCD: CPT | Performed by: OTOLARYNGOLOGY

## 2025-05-22 RX ORDER — METHYLPREDNISOLONE 4 MG/1
TABLET ORAL
Qty: 21 TABLET | Refills: 0 | Status: SHIPPED | OUTPATIENT
Start: 2025-05-22

## 2025-05-22 RX ORDER — FLUTICASONE PROPIONATE 50 MCG
2 SPRAY, SUSPENSION (ML) NASAL DAILY
Qty: 48 G | Refills: 0 | Status: SHIPPED | OUTPATIENT
Start: 2025-05-22

## 2025-05-22 RX ORDER — AZELASTINE 1 MG/ML
2 SPRAY, METERED NASAL 2 TIMES DAILY
Qty: 90 ML | Refills: 0 | Status: SHIPPED | OUTPATIENT
Start: 2025-05-22

## 2025-05-22 RX ORDER — FEXOFENADINE HCL 180 MG/1
180 TABLET ORAL DAILY PRN
Qty: 90 TABLET | Refills: 0 | Status: SHIPPED | OUTPATIENT
Start: 2025-05-22

## 2025-05-22 ASSESSMENT — ENCOUNTER SYMPTOMS
CHILLS: 1
NAUSEA: 1
NUMBNESS: 1
VOMITING: 0
DIARRHEA: 0
RHINORRHEA: 1
SORE THROAT: 1
HEADACHES: 1
NECK PAIN: 1
SORE THROAT: 1
ABDOMINAL PAIN: 0
HEADACHES: 1
COUGH: 0
ROS GI COMMENTS: HEARTBURN

## 2025-05-22 NOTE — PROGRESS NOTES
Subjective   Patient ID: Gael Lara is a 49 y.o. male  HPI  Patient is complaining of intermittent left-sided otalgia and tinnitus.  He also complains of rhinorrhea and postnasal drainage.  He has no purulence from his nose and no facial pain.  He complains of intermittent neck pain and migraines and tingling in his arms and restlessness that seem to have worsened in the last few days.    Review of Systems   Constitutional:  Positive for chills.   HENT:  Positive for ear pain, sore throat and tinnitus.    Gastrointestinal:  Positive for nausea.        Heartburn   Neurological:  Positive for numbness and headaches.       Objective   Physical Exam  The following elements of a brief ear nose and throat exam were performed: External ear canals and tympanic membranes, external nose and nasal passages, oral cavity, palpation of the neck, percussion of the face, palpation of the thyroid.    There is nasal edema and the turbinates are pale.  There is a deviation of the septum to the right side.  There is tenderness with palpation of the temporomandibular joint on the left side.  There is evidence of bruxism noted on the lower teeth.  The remainder his exam was within normal limits.    Assessment/Plan   Diagnoses and all orders for this visit:  TMJ syndrome (Primary)  -     methylPREDNISolone (Medrol Dospak) 4 mg tablets; Follow schedule on package instructions  Other migraine without status migrainosus, not intractable  -     Referral to Neurology; Future  Referred otalgia of left ear  Non-seasonal allergic rhinitis due to other allergic trigger  -     azelastine (Astelin) 137 mcg (0.1 %) nasal spray; Administer 2 sprays into each nostril 2 times a day.  -     fexofenadine (Allegra) 180 mg tablet; Take 1 tablet (180 mg) by mouth once daily as needed (allergy symptoms).  -     fluticasone (Flonase) 50 mcg/actuation nasal spray; Administer 2 sprays into each nostril once daily.     1.  Chronic allergic rhinitis with  deviation of the septum leading to nasal congestion.  The patient was started on Flonase and Astelin and Allegra.  2.  Chronic left referred otalgia and TMJ syndrome.  The patient was started on a Medrol pack and TMJ precautions and he was referred to his dentist for a bite guard.  3.  Nonspecific chronic neurologic symptoms including tingling in the arms and numbness and restlessness and migraines.  The patient was referred to her neurologist.  He will follow-up in 6 weeks.

## 2025-05-25 DIAGNOSIS — K13.79 MOUTH SORES: ICD-10-CM

## 2025-05-26 ENCOUNTER — HOSPITAL ENCOUNTER (EMERGENCY)
Facility: HOSPITAL | Age: 50
Discharge: HOME | End: 2025-05-26
Attending: EMERGENCY MEDICINE
Payer: MEDICARE

## 2025-05-26 ENCOUNTER — APPOINTMENT (OUTPATIENT)
Dept: RADIOLOGY | Facility: HOSPITAL | Age: 50
End: 2025-05-26
Payer: MEDICARE

## 2025-05-26 VITALS
OXYGEN SATURATION: 100 % | TEMPERATURE: 97.1 F | HEART RATE: 80 BPM | DIASTOLIC BLOOD PRESSURE: 90 MMHG | SYSTOLIC BLOOD PRESSURE: 130 MMHG | RESPIRATION RATE: 16 BRPM

## 2025-05-26 DIAGNOSIS — R51.9 ACUTE NONINTRACTABLE HEADACHE, UNSPECIFIED HEADACHE TYPE: Primary | ICD-10-CM

## 2025-05-26 LAB
ALBUMIN SERPL BCP-MCNC: 4.6 G/DL (ref 3.4–5)
ALP SERPL-CCNC: 72 U/L (ref 33–120)
ALT SERPL W P-5'-P-CCNC: 18 U/L (ref 10–52)
ANION GAP SERPL CALC-SCNC: 14 MMOL/L (ref 10–20)
APTT PPP: 30 SECONDS (ref 26–36)
AST SERPL W P-5'-P-CCNC: 19 U/L (ref 9–39)
BASOPHILS # BLD AUTO: 0.06 X10*3/UL (ref 0–0.1)
BASOPHILS NFR BLD AUTO: 0.4 %
BILIRUB SERPL-MCNC: 0.7 MG/DL (ref 0–1.2)
BUN SERPL-MCNC: 21 MG/DL (ref 6–23)
CALCIUM SERPL-MCNC: 9.4 MG/DL (ref 8.6–10.3)
CARDIAC TROPONIN I PNL SERPL HS: 3 NG/L (ref 0–20)
CARDIAC TROPONIN I PNL SERPL HS: 3 NG/L (ref 0–20)
CHLORIDE SERPL-SCNC: 104 MMOL/L (ref 98–107)
CO2 SERPL-SCNC: 24 MMOL/L (ref 21–32)
CREAT SERPL-MCNC: 0.81 MG/DL (ref 0.5–1.3)
EGFRCR SERPLBLD CKD-EPI 2021: >90 ML/MIN/1.73M*2
EOSINOPHIL # BLD AUTO: 0.05 X10*3/UL (ref 0–0.7)
EOSINOPHIL NFR BLD AUTO: 0.4 %
ERYTHROCYTE [DISTWIDTH] IN BLOOD BY AUTOMATED COUNT: 13.2 % (ref 11.5–14.5)
GLUCOSE SERPL-MCNC: 111 MG/DL (ref 74–99)
HCT VFR BLD AUTO: 46.2 % (ref 41–52)
HGB BLD-MCNC: 15.3 G/DL (ref 13.5–17.5)
IMM GRANULOCYTES # BLD AUTO: 0.06 X10*3/UL (ref 0–0.7)
IMM GRANULOCYTES NFR BLD AUTO: 0.4 % (ref 0–0.9)
INR PPP: 1 (ref 0.9–1.1)
LYMPHOCYTES # BLD AUTO: 2.6 X10*3/UL (ref 1.2–4.8)
LYMPHOCYTES NFR BLD AUTO: 18.9 %
MCH RBC QN AUTO: 31 PG (ref 26–34)
MCHC RBC AUTO-ENTMCNC: 33.1 G/DL (ref 32–36)
MCV RBC AUTO: 94 FL (ref 80–100)
MONOCYTES # BLD AUTO: 0.68 X10*3/UL (ref 0.1–1)
MONOCYTES NFR BLD AUTO: 4.9 %
NEUTROPHILS # BLD AUTO: 10.3 X10*3/UL (ref 1.2–7.7)
NEUTROPHILS NFR BLD AUTO: 75 %
NRBC BLD-RTO: 0 /100 WBCS (ref 0–0)
PLATELET # BLD AUTO: 404 X10*3/UL (ref 150–450)
POTASSIUM SERPL-SCNC: 4.4 MMOL/L (ref 3.5–5.3)
PROT SERPL-MCNC: 6.9 G/DL (ref 6.4–8.2)
PROTHROMBIN TIME: 11.3 SECONDS (ref 9.8–12.4)
RBC # BLD AUTO: 4.94 X10*6/UL (ref 4.5–5.9)
SODIUM SERPL-SCNC: 138 MMOL/L (ref 136–145)
WBC # BLD AUTO: 13.8 X10*3/UL (ref 4.4–11.3)

## 2025-05-26 PROCEDURE — 36415 COLL VENOUS BLD VENIPUNCTURE: CPT | Performed by: EMERGENCY MEDICINE

## 2025-05-26 PROCEDURE — 70450 CT HEAD/BRAIN W/O DYE: CPT

## 2025-05-26 PROCEDURE — 96361 HYDRATE IV INFUSION ADD-ON: CPT

## 2025-05-26 PROCEDURE — 2500000004 HC RX 250 GENERAL PHARMACY W/ HCPCS (ALT 636 FOR OP/ED): Mod: JZ | Performed by: EMERGENCY MEDICINE

## 2025-05-26 PROCEDURE — 85730 THROMBOPLASTIN TIME PARTIAL: CPT | Performed by: EMERGENCY MEDICINE

## 2025-05-26 PROCEDURE — 96374 THER/PROPH/DIAG INJ IV PUSH: CPT

## 2025-05-26 PROCEDURE — 84484 ASSAY OF TROPONIN QUANT: CPT | Performed by: EMERGENCY MEDICINE

## 2025-05-26 PROCEDURE — 99285 EMERGENCY DEPT VISIT HI MDM: CPT | Mod: 25 | Performed by: EMERGENCY MEDICINE

## 2025-05-26 PROCEDURE — 80053 COMPREHEN METABOLIC PANEL: CPT | Performed by: EMERGENCY MEDICINE

## 2025-05-26 PROCEDURE — 96375 TX/PRO/DX INJ NEW DRUG ADDON: CPT

## 2025-05-26 PROCEDURE — 93005 ELECTROCARDIOGRAM TRACING: CPT

## 2025-05-26 PROCEDURE — 85610 PROTHROMBIN TIME: CPT | Performed by: EMERGENCY MEDICINE

## 2025-05-26 PROCEDURE — 2500000001 HC RX 250 WO HCPCS SELF ADMINISTERED DRUGS (ALT 637 FOR MEDICARE OP): Performed by: EMERGENCY MEDICINE

## 2025-05-26 PROCEDURE — 85025 COMPLETE CBC W/AUTO DIFF WBC: CPT | Performed by: EMERGENCY MEDICINE

## 2025-05-26 PROCEDURE — 70450 CT HEAD/BRAIN W/O DYE: CPT | Performed by: RADIOLOGY

## 2025-05-26 RX ORDER — DIPHENHYDRAMINE HYDROCHLORIDE 50 MG/ML
25 INJECTION, SOLUTION INTRAMUSCULAR; INTRAVENOUS ONCE
Status: COMPLETED | OUTPATIENT
Start: 2025-05-26 | End: 2025-05-26

## 2025-05-26 RX ORDER — METOCLOPRAMIDE HYDROCHLORIDE 5 MG/ML
10 INJECTION INTRAMUSCULAR; INTRAVENOUS ONCE
Status: COMPLETED | OUTPATIENT
Start: 2025-05-26 | End: 2025-05-26

## 2025-05-26 RX ORDER — KETOROLAC TROMETHAMINE 30 MG/ML
15 INJECTION, SOLUTION INTRAMUSCULAR; INTRAVENOUS ONCE
Status: COMPLETED | OUTPATIENT
Start: 2025-05-26 | End: 2025-05-26

## 2025-05-26 RX ORDER — PREDNISONE 20 MG/1
20 TABLET ORAL DAILY
Qty: 3 TABLET | Refills: 0 | Status: SHIPPED | OUTPATIENT
Start: 2025-05-26 | End: 2025-05-29

## 2025-05-26 RX ORDER — ACETAMINOPHEN 325 MG/1
650 TABLET ORAL ONCE
Status: COMPLETED | OUTPATIENT
Start: 2025-05-26 | End: 2025-05-26

## 2025-05-26 RX ADMIN — SODIUM CHLORIDE 1000 ML: 0.9 INJECTION, SOLUTION INTRAVENOUS at 15:10

## 2025-05-26 RX ADMIN — DIPHENHYDRAMINE HYDROCHLORIDE 25 MG: 50 INJECTION, SOLUTION INTRAMUSCULAR; INTRAVENOUS at 15:10

## 2025-05-26 RX ADMIN — KETOROLAC TROMETHAMINE 15 MG: 30 INJECTION, SOLUTION INTRAMUSCULAR at 15:09

## 2025-05-26 RX ADMIN — METOCLOPRAMIDE 10 MG: 5 INJECTION, SOLUTION INTRAMUSCULAR; INTRAVENOUS at 15:10

## 2025-05-26 RX ADMIN — ACETAMINOPHEN 650 MG: 325 TABLET ORAL at 15:10

## 2025-05-26 ASSESSMENT — COLUMBIA-SUICIDE SEVERITY RATING SCALE - C-SSRS
1. IN THE PAST MONTH, HAVE YOU WISHED YOU WERE DEAD OR WISHED YOU COULD GO TO SLEEP AND NOT WAKE UP?: NO
2. HAVE YOU ACTUALLY HAD ANY THOUGHTS OF KILLING YOURSELF?: NO
6. HAVE YOU EVER DONE ANYTHING, STARTED TO DO ANYTHING, OR PREPARED TO DO ANYTHING TO END YOUR LIFE?: NO

## 2025-05-26 ASSESSMENT — PAIN SCALES - GENERAL
PAINLEVEL_OUTOF10: 7
PAINLEVEL_OUTOF10: 5 - MODERATE PAIN

## 2025-05-26 ASSESSMENT — PAIN - FUNCTIONAL ASSESSMENT
PAIN_FUNCTIONAL_ASSESSMENT: 0-10
PAIN_FUNCTIONAL_ASSESSMENT: 0-10

## 2025-05-26 NOTE — ED TRIAGE NOTES
Patient to ED for c/o HA that started 8 days. Patient states no OTC medications are working for him. Patient also reporting upper back, ear and neck pain. Patient report left sided tingling and numbness x 8 days. Patient reports restlessness and chills. Patient describes it being a feeling of withdrawal like he's had in the pass, but he is not currently usingt. Patient currently on Vyvanse and Adderall.

## 2025-05-26 NOTE — DISCHARGE INSTRUCTIONS
Please take Tylenol and/or Motrin as needed for headache for the next 2 to 3 days.  Please use the prednisone as prescribed to help with headache.  Please return to the ED for worsening headache, difficulty moving arms or legs, change in behavior or any other concerning symptoms

## 2025-05-26 NOTE — Clinical Note
Gael Lara was seen and treated in our emergency department on 5/26/2025.  He may return to work on 05/29/2025.       If you have any questions or concerns, please don't hesitate to call.      Michael Tineo MD

## 2025-05-27 ENCOUNTER — APPOINTMENT (OUTPATIENT)
Dept: CARDIOLOGY | Facility: HOSPITAL | Age: 50
End: 2025-05-27
Payer: MEDICARE

## 2025-05-27 LAB
ATRIAL RATE: 85 BPM
P AXIS: 44 DEGREES
P OFFSET: 204 MS
P ONSET: 156 MS
PR INTERVAL: 126 MS
Q ONSET: 219 MS
QRS COUNT: 14 BEATS
QRS DURATION: 90 MS
QT INTERVAL: 366 MS
QTC CALCULATION(BAZETT): 435 MS
QTC FREDERICIA: 411 MS
R AXIS: 24 DEGREES
T AXIS: 8 DEGREES
T OFFSET: 402 MS
VENTRICULAR RATE: 85 BPM

## 2025-05-27 RX ORDER — VALACYCLOVIR HYDROCHLORIDE 1 G/1
1000 TABLET, FILM COATED ORAL EVERY 12 HOURS
Qty: 60 TABLET | Refills: 0 | Status: SHIPPED | OUTPATIENT
Start: 2025-05-27

## 2025-06-05 NOTE — ED PROVIDER NOTES
Emergency Department Provider Note       History of Present Illness     History provided by: Patient  Limitations to History: None  External Records Reviewed with Brief Summary: Outpatient progress note from 5/22/25 which showed the appointment for chronic sinusitis and seasonal allergies and TMJ syndrome    HPI:  Gael Lara is a 49 y.o. male with past medical history of headaches he does describe headache for the last 8 days.  Does describe some left-sided tingling over his entire body and some paresthesias.  He states that he has not been sleeping very well for the last 8 days.  Denies any mitchell.  Denies any fever or chills.  Denies any focal neurofindings.    Physical Exam   Triage vitals:  T 36.2 °C (97.1 °F)  HR 83  /85  RR 18  O2 100 % None (Room air)    Physical Exam  Vitals and nursing note reviewed.   Constitutional:       General: He is not in acute distress.     Appearance: He is well-developed and normal weight. He is not ill-appearing.   HENT:      Head: Normocephalic and atraumatic.      Mouth/Throat:      Mouth: Mucous membranes are moist.   Eyes:      Extraocular Movements: Extraocular movements intact.      Right eye: No nystagmus.      Left eye: Normal extraocular motion and no nystagmus.      Pupils: Pupils are equal, round, and reactive to light.      Right eye: Pupil is reactive.   Neck:      Meningeal: Brudzinski's sign and Kernig's sign absent.   Cardiovascular:      Rate and Rhythm: Normal rate and regular rhythm.      Heart sounds: Normal heart sounds.   Pulmonary:      Effort: Pulmonary effort is normal.      Breath sounds: Normal breath sounds. No stridor. No wheezing.   Abdominal:      General: Bowel sounds are normal. There is no distension.      Palpations: Abdomen is soft.      Tenderness: There is no abdominal tenderness. There is no guarding.   Musculoskeletal:         General: Normal range of motion.      Cervical back: Normal range of motion and neck supple. No  rigidity.   Skin:     General: Skin is warm and dry.   Neurological:      Mental Status: He is alert and oriented to person, place, and time. Mental status is at baseline.      GCS: GCS eye subscore is 4. GCS verbal subscore is 5. GCS motor subscore is 6.      Cranial Nerves: No cranial nerve deficit.      Sensory: No sensory deficit.      Motor: No weakness.      Coordination: Romberg sign negative.   Psychiatric:         Mood and Affect: Mood normal. Mood is not depressed.         Speech: Speech normal.         Behavior: Behavior normal. Behavior is not agitated.           Medical Decision Making & ED Course   Medical Decision Makin y.o. male with past medical history of headaches, ADHD, TMJ does present with the complaint of headache for the last 8 days.  In the setting of not really sleeping very well as well as some medication changes.  Per chart review is also noted to have hypertension which is diet controlled and given his atypical nature of his headache he was sent for CT head.  This was negative for acute pathology.  Received Tylenol and Reglan and Benadryl and fluids and have improvement his headache.  His neuroexam does not show any focal deficits.  Denies any signs for acute stroke or nerve compression.  I did obtain his CBC and CMP and troponin.  Troponins negative for ACS.  There was no acute kidney injury.  There was no metabolic anion gap acidosis.  White blood cell count was noted at 13.8 however see no clear signs for infection.  Following treatment he is feeling improved.  Ambulatory and tolerating p.o.  He may safely discharged home with a diagnosis atypical headache plan for outpatient follow-up.  Turn precautions are discussed.----    EKG interpreted by me showed normal sinus rhythm at a rate of of 85 with no acute ischemic changes.  No STEMI.  No A-fib    EKG interpreted by shows NSR 99 with no STEMI no afib  Differential diagnoses considered include but are not limited to: headache  bleed stroke    Social Determinants of Health which Significantly Impact Care: Social Determinants of Health which Significantly Impact Care: None identified     EKG Independent Interpretation: EKG interpreted by myself. Please see ED Course for full interpretation.    Independent Result Review and Interpretation: Relevant laboratory and radiographic results were reviewed and independently interpreted by myself.  As necessary, they are commented on in the ED Course.    Chronic conditions affecting the patient's care: As documented above in [facility]    The patient was discussed with the following consultants/services: None    Care Considerations: As documented above in [facility]    ED Course:  Diagnoses as of 06/05/25 0009   Acute nonintractable headache, unspecified headache type       Disposition   As a result of the work-up, the patient was discharged home.  he was informed of his diagnosis and instructed to come back with any concerns or worsening of condition.  he and was agreeable to the plan as discussed above.  he was given the opportunity to ask questions.  All of the patient's questions were answered.    Procedures   Procedures        Michael Tineo MD  Emergency Medicine                                                       Michael Tineo MD  06/05/25 0014

## 2025-06-21 DIAGNOSIS — I10 BENIGN ESSENTIAL HYPERTENSION: ICD-10-CM

## 2025-06-21 DIAGNOSIS — E78.2 ELEVATED TRIGLYCERIDES WITH HIGH CHOLESTEROL: ICD-10-CM

## 2025-06-23 RX ORDER — GEMFIBROZIL 600 MG/1
600 TABLET, FILM COATED ORAL DAILY
Qty: 90 TABLET | Refills: 0 | Status: SHIPPED | OUTPATIENT
Start: 2025-06-23

## 2025-06-23 RX ORDER — HYDROCHLOROTHIAZIDE 12.5 MG/1
12.5 TABLET ORAL
Qty: 90 TABLET | Refills: 0 | Status: SHIPPED | OUTPATIENT
Start: 2025-06-23

## 2025-07-14 ENCOUNTER — TELEPHONE (OUTPATIENT)
Dept: PRIMARY CARE | Facility: CLINIC | Age: 50
End: 2025-07-14
Payer: MEDICARE

## 2025-07-28 DIAGNOSIS — R52 PAIN: ICD-10-CM

## 2025-07-29 RX ORDER — BACLOFEN 5 MG/1
5 TABLET ORAL 3 TIMES DAILY
Qty: 270 TABLET | Refills: 0 | Status: SHIPPED | OUTPATIENT
Start: 2025-07-29 | End: 2025-07-30

## 2025-07-30 DIAGNOSIS — G89.29 CHRONIC BILATERAL LOW BACK PAIN WITHOUT SCIATICA: ICD-10-CM

## 2025-07-30 DIAGNOSIS — M54.50 CHRONIC BILATERAL LOW BACK PAIN WITHOUT SCIATICA: ICD-10-CM

## 2025-07-30 DIAGNOSIS — R52 PAIN: ICD-10-CM

## 2025-07-30 RX ORDER — BACLOFEN 5 MG/1
5 TABLET ORAL 3 TIMES DAILY
Qty: 270 TABLET | Refills: 0 | Status: SHIPPED | OUTPATIENT
Start: 2025-07-30

## 2025-07-30 RX ORDER — NAPROXEN 500 MG/1
TABLET ORAL
Qty: 90 TABLET | Refills: 0 | Status: SHIPPED | OUTPATIENT
Start: 2025-07-30

## 2025-08-05 ENCOUNTER — TELEPHONE (OUTPATIENT)
Dept: PRIMARY CARE | Facility: CLINIC | Age: 50
End: 2025-08-05
Payer: MEDICARE

## 2025-09-03 ENCOUNTER — APPOINTMENT (OUTPATIENT)
Dept: PRIMARY CARE | Facility: CLINIC | Age: 50
End: 2025-09-03
Payer: MEDICARE

## 2025-10-09 ENCOUNTER — APPOINTMENT (OUTPATIENT)
Dept: PRIMARY CARE | Facility: CLINIC | Age: 50
End: 2025-10-09
Payer: MEDICARE